# Patient Record
Sex: FEMALE | Race: BLACK OR AFRICAN AMERICAN | NOT HISPANIC OR LATINO | Employment: FULL TIME | URBAN - METROPOLITAN AREA
[De-identification: names, ages, dates, MRNs, and addresses within clinical notes are randomized per-mention and may not be internally consistent; named-entity substitution may affect disease eponyms.]

---

## 2023-04-01 PROBLEM — N92.6 MISSED MENSES: Status: ACTIVE | Noted: 2023-04-01

## 2023-04-01 PROBLEM — N90.89 VULVAR LESION: Status: ACTIVE | Noted: 2023-04-01

## 2023-04-01 PROBLEM — S39.92XA TAILBONE INJURY: Status: ACTIVE | Noted: 2023-04-01

## 2023-04-01 PROBLEM — Z87.828 HISTORY OF INJURY OF NECK: Status: ACTIVE | Noted: 2023-04-01

## 2023-04-01 PROBLEM — N76.0 BV (BACTERIAL VAGINOSIS): Status: ACTIVE | Noted: 2023-04-01

## 2023-04-01 PROBLEM — Z04.9 CONDITION NOT FOUND: Status: ACTIVE | Noted: 2023-04-01

## 2023-04-01 PROBLEM — R12 HEARTBURN DURING PREGNANCY IN THIRD TRIMESTER (HHS-HCC): Status: ACTIVE | Noted: 2023-04-01
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
## 2023-04-01 PROBLEM — B96.89 BV (BACTERIAL VAGINOSIS): Status: ACTIVE | Noted: 2023-04-01

## 2023-04-01 PROBLEM — O26.893 HEARTBURN DURING PREGNANCY IN THIRD TRIMESTER (HHS-HCC): Status: ACTIVE | Noted: 2023-04-01

## 2023-04-01 RX ORDER — MEDROXYPROGESTERONE ACETATE 150 MG/ML
INJECTION, SUSPENSION INTRAMUSCULAR
COMMUNITY

## 2023-04-03 ENCOUNTER — LAB (OUTPATIENT)
Dept: LAB | Facility: LAB | Age: 23
End: 2023-04-03
Payer: COMMERCIAL

## 2023-04-03 ENCOUNTER — OFFICE VISIT (OUTPATIENT)
Dept: PRIMARY CARE | Facility: CLINIC | Age: 23
End: 2023-04-03
Payer: COMMERCIAL

## 2023-04-03 VITALS
BODY MASS INDEX: 21.89 KG/M2 | SYSTOLIC BLOOD PRESSURE: 100 MMHG | WEIGHT: 136.2 LBS | TEMPERATURE: 98.4 F | HEART RATE: 111 BPM | HEIGHT: 66 IN | DIASTOLIC BLOOD PRESSURE: 68 MMHG

## 2023-04-03 DIAGNOSIS — R45.89 DEPRESSED MOOD: ICD-10-CM

## 2023-04-03 DIAGNOSIS — L81.9 DISORDER OF PIGMENTATION OF LIP: Primary | ICD-10-CM

## 2023-04-03 DIAGNOSIS — Z11.3 SCREEN FOR STD (SEXUALLY TRANSMITTED DISEASE): ICD-10-CM

## 2023-04-03 DIAGNOSIS — Z80.0 FAMILY HISTORY OF MALIGNANT NEOPLASM OF COLON IN RELATIVE DIAGNOSED WHEN YOUNGER THAN 50 YEARS OF AGE: ICD-10-CM

## 2023-04-03 PROBLEM — N90.89 VULVAR LESION: Status: RESOLVED | Noted: 2023-04-01 | Resolved: 2023-04-03

## 2023-04-03 PROBLEM — N92.6 MISSED MENSES: Status: RESOLVED | Noted: 2023-04-01 | Resolved: 2023-04-03

## 2023-04-03 PROBLEM — Z87.828 HISTORY OF INJURY OF NECK: Status: RESOLVED | Noted: 2023-04-01 | Resolved: 2023-04-03

## 2023-04-03 PROBLEM — E75.4: Status: ACTIVE | Noted: 2023-04-03

## 2023-04-03 PROBLEM — B96.89 BV (BACTERIAL VAGINOSIS): Status: RESOLVED | Noted: 2023-04-01 | Resolved: 2023-04-03

## 2023-04-03 PROBLEM — S39.92XA TAILBONE INJURY: Status: RESOLVED | Noted: 2023-04-01 | Resolved: 2023-04-03

## 2023-04-03 PROBLEM — N76.0 BV (BACTERIAL VAGINOSIS): Status: RESOLVED | Noted: 2023-04-01 | Resolved: 2023-04-03

## 2023-04-03 PROBLEM — Z04.9 CONDITION NOT FOUND: Status: RESOLVED | Noted: 2023-04-01 | Resolved: 2023-04-03

## 2023-04-03 PROCEDURE — 87661 TRICHOMONAS VAGINALIS AMPLIF: CPT

## 2023-04-03 PROCEDURE — 86592 SYPHILIS TEST NON-TREP QUAL: CPT

## 2023-04-03 PROCEDURE — 87389 HIV-1 AG W/HIV-1&-2 AB AG IA: CPT

## 2023-04-03 PROCEDURE — 87591 N.GONORRHOEAE DNA AMP PROB: CPT

## 2023-04-03 PROCEDURE — 86695 HERPES SIMPLEX TYPE 1 TEST: CPT

## 2023-04-03 PROCEDURE — 87491 CHLMYD TRACH DNA AMP PROBE: CPT

## 2023-04-03 PROCEDURE — 36415 COLL VENOUS BLD VENIPUNCTURE: CPT

## 2023-04-03 PROCEDURE — 84443 ASSAY THYROID STIM HORMONE: CPT

## 2023-04-03 PROCEDURE — 1036F TOBACCO NON-USER: CPT | Performed by: FAMILY MEDICINE

## 2023-04-03 PROCEDURE — 82607 VITAMIN B-12: CPT

## 2023-04-03 PROCEDURE — 80053 COMPREHEN METABOLIC PANEL: CPT

## 2023-04-03 PROCEDURE — 99204 OFFICE O/P NEW MOD 45 MIN: CPT | Performed by: FAMILY MEDICINE

## 2023-04-03 PROCEDURE — 86696 HERPES SIMPLEX TYPE 2 TEST: CPT

## 2023-04-03 PROCEDURE — 82306 VITAMIN D 25 HYDROXY: CPT

## 2023-04-03 PROCEDURE — 85027 COMPLETE CBC AUTOMATED: CPT

## 2023-04-03 PROCEDURE — 87340 HEPATITIS B SURFACE AG IA: CPT

## 2023-04-03 ASSESSMENT — ENCOUNTER SYMPTOMS
LOSS OF SENSATION IN FEET: 0
DEPRESSION: 0
OCCASIONAL FEELINGS OF UNSTEADINESS: 0

## 2023-04-03 NOTE — PROGRESS NOTES
Subjective   Patient ID: Deena Thompson is a 22 y.o. female who presents for Annual Exam.  HPI  The patient is a 23 yo AA Female WITH A STRONG FAMILY HISTORY OF COLON CANCER ( sister diagnosed at 35 yo) who is here for a CPE and the evaluation of her lips hyper pigmentation that started couple weeks ago without any obvious reason. No know associated local trauma or pain.    She is also requesting a blood test to screen for vitamin deficiency.  It was requested by her psychologist, currently treating her depression.    A review of system was completed.  All systems were reviewed and were normal, except for the ones that are listed in the HPI.    Objective   Physical Exam  Constitutional:       Appearance: Normal appearance.   HENT:      Head: Normocephalic and atraumatic.      Right Ear: Tympanic membrane, ear canal and external ear normal.      Left Ear: Tympanic membrane, ear canal and external ear normal.      Nose: Nose normal.      Mouth/Throat:      Mouth: Mucous membranes are moist.      Pharynx: Oropharynx is clear.   Eyes:      Extraocular Movements: Extraocular movements intact.      Conjunctiva/sclera: Conjunctivae normal.      Pupils: Pupils are equal, round, and reactive to light.   Cardiovascular:      Rate and Rhythm: Normal rate and regular rhythm.      Pulses: Normal pulses.   Pulmonary:      Effort: Pulmonary effort is normal.      Breath sounds: Normal breath sounds.   Abdominal:      General: Abdomen is flat. Bowel sounds are normal.      Palpations: Abdomen is soft.   Musculoskeletal:         General: Normal range of motion.      Cervical back: Normal range of motion and neck supple.   Skin:     General: Skin is warm.   Neurological:      General: No focal deficit present.      Mental Status: She is alert and oriented to person, place, and time. Mental status is at baseline.   Psychiatric:         Mood and Affect: Mood normal.         Behavior: Behavior normal.         Thought Content: Thought  content normal.         Judgment: Judgment normal.         Assessment/Plan   Problem List Items Addressed This Visit          Other    Depressed mood    Relevant Orders    CBC    Comprehensive Metabolic Panel    Vitamin D 25-Hydroxy,Total    Vitamin B12    TSH with reflex to Free T4 if abnormal    Disorder of pigmentation of lip - Primary     Observe for now.  Dermatologist referral to consider if persisting.         Family history of malignant neoplasm of colon in relative diagnosed when younger than 50 years of age   1-  CPE   -Diet and life modifications discussed.  -Colon cancer screening recommended to be started at 25 yo because patient's sister was diagnosed wit colon cancer at 62yo.  - last pap smear 2021: [facility]- no pap done last year for pregnancy- recommended to get her pap smear done this year by her GYN.  Discussed today- patient agreed with the above plan of care.

## 2023-04-04 LAB
CHLAMYDIA TRACH., AMPLIFIED: NEGATIVE
N. GONORRHEA, AMPLIFIED: NEGATIVE
TRICHOMONAS VAGINALIS: NEGATIVE

## 2023-04-05 LAB
ALANINE AMINOTRANSFERASE (SGPT) (U/L) IN SER/PLAS: 12 U/L (ref 7–45)
ALBUMIN (G/DL) IN SER/PLAS: 4.3 G/DL (ref 3.4–5)
ALKALINE PHOSPHATASE (U/L) IN SER/PLAS: 64 U/L (ref 33–110)
ANION GAP IN SER/PLAS: 14 MMOL/L (ref 10–20)
ASPARTATE AMINOTRANSFERASE (SGOT) (U/L) IN SER/PLAS: 14 U/L (ref 9–39)
BILIRUBIN TOTAL (MG/DL) IN SER/PLAS: 1 MG/DL (ref 0–1.2)
CALCIDIOL (25 OH VITAMIN D3) (NG/ML) IN SER/PLAS: 21 NG/ML
CALCIUM (MG/DL) IN SER/PLAS: 9.7 MG/DL (ref 8.6–10.6)
CARBON DIOXIDE, TOTAL (MMOL/L) IN SER/PLAS: 24 MMOL/L (ref 21–32)
CHLORIDE (MMOL/L) IN SER/PLAS: 105 MMOL/L (ref 98–107)
COBALAMIN (VITAMIN B12) (PG/ML) IN SER/PLAS: 616 PG/ML (ref 211–911)
CREATININE (MG/DL) IN SER/PLAS: 0.85 MG/DL (ref 0.5–1.05)
ERYTHROCYTE DISTRIBUTION WIDTH (RATIO) BY AUTOMATED COUNT: 12.2 % (ref 11.5–14.5)
ERYTHROCYTE MEAN CORPUSCULAR HEMOGLOBIN CONCENTRATION (G/DL) BY AUTOMATED: 31.9 G/DL (ref 32–36)
ERYTHROCYTE MEAN CORPUSCULAR VOLUME (FL) BY AUTOMATED COUNT: 92 FL (ref 80–100)
ERYTHROCYTES (10*6/UL) IN BLOOD BY AUTOMATED COUNT: 4.13 X10E12/L (ref 4–5.2)
GFR FEMALE: >90 ML/MIN/1.73M2
GLUCOSE (MG/DL) IN SER/PLAS: 80 MG/DL (ref 74–99)
HEMATOCRIT (%) IN BLOOD BY AUTOMATED COUNT: 37.9 % (ref 36–46)
HEMOGLOBIN (G/DL) IN BLOOD: 12.1 G/DL (ref 12–16)
HEPATITIS B VIRUS SURFACE AG PRESENCE IN SERUM: NONREACTIVE
HERPES SIMPLEX VIRUS 1 IGG: >8 INDEX
HERPES SIMPLEX VIRUS 2 IGG: 5.6 INDEX
HIV 1/ 2 AG/AB SCREEN: NONREACTIVE
LEUKOCYTES (10*3/UL) IN BLOOD BY AUTOMATED COUNT: 6.1 X10E9/L (ref 4.4–11.3)
NRBC (PER 100 WBCS) BY AUTOMATED COUNT: 0 /100 WBC (ref 0–0)
PLATELETS (10*3/UL) IN BLOOD AUTOMATED COUNT: 331 X10E9/L (ref 150–450)
POTASSIUM (MMOL/L) IN SER/PLAS: 3.8 MMOL/L (ref 3.5–5.3)
PROTEIN TOTAL: 7 G/DL (ref 6.4–8.2)
RPR MONITORING: NORMAL
SODIUM (MMOL/L) IN SER/PLAS: 139 MMOL/L (ref 136–145)
THYROTROPIN (MIU/L) IN SER/PLAS BY DETECTION LIMIT <= 0.05 MIU/L: 1.13 MIU/L (ref 0.44–3.98)
UREA NITROGEN (MG/DL) IN SER/PLAS: 13 MG/DL (ref 6–23)
VDRL: NONREACTIVE

## 2023-04-11 ENCOUNTER — TELEMEDICINE (OUTPATIENT)
Dept: PRIMARY CARE | Facility: CLINIC | Age: 23
End: 2023-04-11
Payer: COMMERCIAL

## 2023-04-11 DIAGNOSIS — B00.9 HSV-2 INFECTION: Primary | ICD-10-CM

## 2023-04-11 PROCEDURE — 99213 OFFICE O/P EST LOW 20 MIN: CPT | Performed by: FAMILY MEDICINE

## 2023-04-11 NOTE — PROGRESS NOTES
Subjective   Patient ID: Deena Thompson is a 22 y.o. female who presents for No chief complaint on file..  HPI  The patient is here to discuss her blood test results.  She was never diagnosed with HSV infection in the past.  A review of system was completed.  All systems were reviewed and were normal, except for the ones that are listed in the HPI.    Objective   Physical Exam  Constitutional:       Appearance: Normal appearance.   Neurological:      Mental Status: She is alert.         Assessment/Plan   Problem List Items Addressed This Visit          Infectious/Inflammatory    HSV-2 infection - Primary     - HSV II infection discussed today with the patient.  All questions were answered.  Safe sex counseling done.  Valacyclovir recommended.  Patient will call back after having her partner tested.

## 2023-04-11 NOTE — ASSESSMENT & PLAN NOTE
- HSV II infection discussed today with the patient.  All questions were answered.  Safe sex counseling done.  Valacyclovir recommended.  Patient will call back after having her partner tested.

## 2023-07-12 ENCOUNTER — APPOINTMENT (OUTPATIENT)
Dept: LAB | Facility: LAB | Age: 23
End: 2023-07-12
Payer: COMMERCIAL

## 2023-08-21 ENCOUNTER — OFFICE VISIT (OUTPATIENT)
Dept: PRIMARY CARE | Facility: CLINIC | Age: 23
End: 2023-08-21
Payer: COMMERCIAL

## 2023-08-21 VITALS
HEART RATE: 81 BPM | DIASTOLIC BLOOD PRESSURE: 78 MMHG | WEIGHT: 142 LBS | SYSTOLIC BLOOD PRESSURE: 128 MMHG | TEMPERATURE: 98.6 F | HEIGHT: 65 IN | BODY MASS INDEX: 23.66 KG/M2

## 2023-08-21 DIAGNOSIS — L30.9 ACUTE DERMATITIS: Primary | ICD-10-CM

## 2023-08-21 DIAGNOSIS — R00.0 TACHYCARDIA: ICD-10-CM

## 2023-08-21 PROCEDURE — 93000 ELECTROCARDIOGRAM COMPLETE: CPT | Performed by: FAMILY MEDICINE

## 2023-08-21 PROCEDURE — 1036F TOBACCO NON-USER: CPT | Performed by: FAMILY MEDICINE

## 2023-08-21 PROCEDURE — 99214 OFFICE O/P EST MOD 30 MIN: CPT | Performed by: FAMILY MEDICINE

## 2023-08-21 NOTE — ASSESSMENT & PLAN NOTE
-EKG done today.   -Blood test ordered today.  -Holter monitor, then follow-up to discuss results.

## 2023-08-21 NOTE — PROGRESS NOTES
Subjective   Patient ID: Geovani Thompson is a 22 y.o. female who presents for Rash (To inner thighs, itchy x 1-2 months).  Rash    The patient is ha 21 yo Female with no history, here for the management of a new skin pruritic rash that started about a month ago and is not getting better.  Only located on both anterior thighs.  It only appears when she scratches her thighs and with local irritation and heat.   The patient is here for the evaluation of episodic episodes of tachycardia that were diagnosed when she donates blood.  They started about 4- 5 y ago and worsen 4- 5 months ago. No associated dizziness but she has random episodes of dizziness and lightheadedness.  Last time was 3 weeks ago.       A review of system was completed.  All systems were reviewed and were normal, except for the ones that are listed in the HPI.    Objective   Physical Exam  Constitutional:       Appearance: Normal appearance.   HENT:      Head: Normocephalic and atraumatic.      Right Ear: Tympanic membrane, ear canal and external ear normal.      Left Ear: Tympanic membrane, ear canal and external ear normal.      Nose: Nose normal.      Mouth/Throat:      Mouth: Mucous membranes are moist.      Pharynx: Oropharynx is clear.   Eyes:      Extraocular Movements: Extraocular movements intact.      Conjunctiva/sclera: Conjunctivae normal.      Pupils: Pupils are equal, round, and reactive to light.   Cardiovascular:      Rate and Rhythm: Normal rate and regular rhythm.      Pulses: Normal pulses.   Pulmonary:      Effort: Pulmonary effort is normal.      Breath sounds: Normal breath sounds.   Abdominal:      General: Abdomen is flat. Bowel sounds are normal.      Palpations: Abdomen is soft.   Musculoskeletal:         General: Normal range of motion.      Cervical back: Normal range of motion and neck supple.   Skin:     General: Skin is warm.   Neurological:      General: No focal deficit present.      Mental Status: She is alert and  oriented to person, place, and time. Mental status is at baseline.   Psychiatric:         Mood and Affect: Mood normal.         Behavior: Behavior normal.         Thought Content: Thought content normal.         Judgment: Judgment normal.         Assessment/Plan   Problem List Items Addressed This Visit       Acute dermatitis - Primary     -Etiology unclear.  Observe for now.  -Loratadine PRN for now.         Relevant Orders    Hemoglobin A1C    Tachycardia     -EKG done today.   -Blood test ordered today.  -Holter monitor, then follow-up to discuss results.          Relevant Orders    ECG 12 lead (Clinic Performed)    Holter or Event Cardiac Monitor    CBC    Comprehensive Metabolic Panel    TSH with reflex to Free T4 if abnormal    Magnesium    Phosphorus    Hemoglobin A1C            Patient to return to office PRN.

## 2023-09-21 PROBLEM — N90.89 VULVAR LESION: Status: ACTIVE | Noted: 2023-09-21

## 2023-09-21 PROBLEM — Z78.9 KNOWN MEDICAL PROBLEMS: Status: ACTIVE | Noted: 2023-09-21

## 2023-09-21 PROBLEM — Z34.90 PREGNANCY (HHS-HCC): Status: ACTIVE | Noted: 2023-09-21

## 2023-09-21 PROBLEM — Z87.898 HISTORY OF MEDICAL PROBLEMS: Status: ACTIVE | Noted: 2023-09-21

## 2023-09-21 PROBLEM — Z32.01 POSITIVE URINE PREGNANCY TEST (HHS-HCC): Status: ACTIVE | Noted: 2023-09-21

## 2023-09-21 RX ORDER — SERTRALINE HYDROCHLORIDE 25 MG/1
1 TABLET, FILM COATED ORAL DAILY
COMMUNITY
Start: 2023-04-18 | End: 2023-11-09 | Stop reason: WASHOUT

## 2023-09-21 RX ORDER — SERTRALINE HYDROCHLORIDE 100 MG/1
TABLET, FILM COATED ORAL
COMMUNITY
Start: 2023-08-09

## 2023-09-21 RX ORDER — SERTRALINE HYDROCHLORIDE 50 MG/1
1 TABLET, FILM COATED ORAL DAILY
COMMUNITY
Start: 2023-06-17 | End: 2023-11-09 | Stop reason: WASHOUT

## 2023-09-21 RX ORDER — TRIPROLIDINE/PSEUDOEPHEDRINE 2.5MG-60MG
30 TABLET ORAL EVERY 6 HOURS
COMMUNITY
Start: 2014-06-18 | End: 2023-11-09 | Stop reason: ALTCHOICE

## 2023-09-21 RX ORDER — DOCUSATE SODIUM 50 MG/5ML
10 LIQUID ORAL 2 TIMES DAILY PRN
COMMUNITY
Start: 2022-05-17

## 2023-09-21 RX ORDER — CYCLOBENZAPRINE HCL 10 MG
1 TABLET ORAL NIGHTLY
COMMUNITY
Start: 2022-12-19 | End: 2023-11-09 | Stop reason: WASHOUT

## 2023-10-20 ENCOUNTER — OFFICE VISIT (OUTPATIENT)
Dept: OBSTETRICS AND GYNECOLOGY | Facility: CLINIC | Age: 23
End: 2023-10-20
Payer: COMMERCIAL

## 2023-10-20 ENCOUNTER — APPOINTMENT (OUTPATIENT)
Dept: PRIMARY CARE | Facility: CLINIC | Age: 23
End: 2023-10-20
Payer: COMMERCIAL

## 2023-10-20 ENCOUNTER — LAB (OUTPATIENT)
Dept: LAB | Facility: LAB | Age: 23
End: 2023-10-20
Payer: COMMERCIAL

## 2023-10-20 DIAGNOSIS — Z30.42 ENCOUNTER FOR MANAGEMENT AND INJECTION OF DEPO-PROVERA: Primary | ICD-10-CM

## 2023-10-20 DIAGNOSIS — L30.9 ACUTE DERMATITIS: ICD-10-CM

## 2023-10-20 DIAGNOSIS — R00.0 TACHYCARDIA: ICD-10-CM

## 2023-10-20 LAB
ALBUMIN SERPL BCP-MCNC: 4.6 G/DL (ref 3.4–5)
ALP SERPL-CCNC: 56 U/L (ref 33–110)
ALT SERPL W P-5'-P-CCNC: 15 U/L (ref 7–45)
ANION GAP SERPL CALC-SCNC: 13 MMOL/L (ref 10–20)
AST SERPL W P-5'-P-CCNC: 16 U/L (ref 9–39)
BILIRUB SERPL-MCNC: 0.8 MG/DL (ref 0–1.2)
BUN SERPL-MCNC: 9 MG/DL (ref 6–23)
CALCIUM SERPL-MCNC: 9.5 MG/DL (ref 8.6–10.6)
CHLORIDE SERPL-SCNC: 105 MMOL/L (ref 98–107)
CO2 SERPL-SCNC: 25 MMOL/L (ref 21–32)
CREAT SERPL-MCNC: 0.69 MG/DL (ref 0.5–1.05)
ERYTHROCYTE [DISTWIDTH] IN BLOOD BY AUTOMATED COUNT: 12.3 % (ref 11.5–14.5)
GFR SERPL CREATININE-BSD FRML MDRD: >90 ML/MIN/1.73M*2
GLUCOSE SERPL-MCNC: 84 MG/DL (ref 74–99)
HCT VFR BLD AUTO: 39.3 % (ref 36–46)
HGB BLD-MCNC: 12.5 G/DL (ref 12–16)
MAGNESIUM SERPL-MCNC: 2.19 MG/DL (ref 1.6–2.4)
MCH RBC QN AUTO: 29.5 PG (ref 26–34)
MCHC RBC AUTO-ENTMCNC: 31.8 G/DL (ref 32–36)
MCV RBC AUTO: 93 FL (ref 80–100)
NRBC BLD-RTO: 0 /100 WBCS (ref 0–0)
PHOSPHATE SERPL-MCNC: 3.3 MG/DL (ref 2.5–4.9)
PLATELET # BLD AUTO: 327 X10*3/UL (ref 150–450)
PMV BLD AUTO: 10.2 FL (ref 7.5–11.5)
POTASSIUM SERPL-SCNC: 3.8 MMOL/L (ref 3.5–5.3)
PROT SERPL-MCNC: 7.5 G/DL (ref 6.4–8.2)
RBC # BLD AUTO: 4.24 X10*6/UL (ref 4–5.2)
SODIUM SERPL-SCNC: 139 MMOL/L (ref 136–145)
WBC # BLD AUTO: 6 X10*3/UL (ref 4.4–11.3)

## 2023-10-20 PROCEDURE — 1036F TOBACCO NON-USER: CPT | Performed by: NURSE PRACTITIONER

## 2023-10-20 PROCEDURE — 84100 ASSAY OF PHOSPHORUS: CPT

## 2023-10-20 PROCEDURE — 85027 COMPLETE CBC AUTOMATED: CPT

## 2023-10-20 PROCEDURE — 84443 ASSAY THYROID STIM HORMONE: CPT

## 2023-10-20 PROCEDURE — 36415 COLL VENOUS BLD VENIPUNCTURE: CPT

## 2023-10-20 PROCEDURE — 80053 COMPREHEN METABOLIC PANEL: CPT

## 2023-10-20 PROCEDURE — 83036 HEMOGLOBIN GLYCOSYLATED A1C: CPT

## 2023-10-20 PROCEDURE — 96372 THER/PROPH/DIAG INJ SC/IM: CPT | Performed by: NURSE PRACTITIONER

## 2023-10-20 PROCEDURE — 83735 ASSAY OF MAGNESIUM: CPT

## 2023-10-20 RX ORDER — MEDROXYPROGESTERONE ACETATE 150 MG/ML
150 INJECTION, SUSPENSION INTRAMUSCULAR ONCE
Status: COMPLETED | OUTPATIENT
Start: 2023-10-20 | End: 2023-10-20

## 2023-10-20 RX ADMIN — MEDROXYPROGESTERONE ACETATE 150 MG: 150 INJECTION, SUSPENSION INTRAMUSCULAR at 16:15

## 2023-10-20 NOTE — PROGRESS NOTES
Last Depo- 7/28/2023  Next Depo due- 1/5/1/2024  Last JS-7/28/2023  UPT offered-declined  LMP-none  Complaints with being on Depo Provera-none  Pt given Depo without difficulties into-rt. upper arm  Pt tolerated injection well     Supplied Depo Provera from office stock  Lot#:[id number]  EXP 12/31/2024

## 2023-10-21 LAB
EST. AVERAGE GLUCOSE BLD GHB EST-MCNC: 100 MG/DL
HBA1C MFR BLD: 5.1 %
TSH SERPL-ACNC: 2.61 MIU/L (ref 0.44–3.98)

## 2023-11-09 ENCOUNTER — OFFICE VISIT (OUTPATIENT)
Dept: PRIMARY CARE | Facility: CLINIC | Age: 23
End: 2023-11-09
Payer: COMMERCIAL

## 2023-11-09 ENCOUNTER — APPOINTMENT (OUTPATIENT)
Dept: PRIMARY CARE | Facility: CLINIC | Age: 23
End: 2023-11-09
Payer: COMMERCIAL

## 2023-11-09 VITALS
HEIGHT: 65 IN | BODY MASS INDEX: 24.99 KG/M2 | SYSTOLIC BLOOD PRESSURE: 120 MMHG | DIASTOLIC BLOOD PRESSURE: 82 MMHG | WEIGHT: 150 LBS

## 2023-11-09 DIAGNOSIS — K42.9 UMBILICAL HERNIA WITHOUT OBSTRUCTION AND WITHOUT GANGRENE: Primary | ICD-10-CM

## 2023-11-09 PROCEDURE — 99213 OFFICE O/P EST LOW 20 MIN: CPT

## 2023-11-09 PROCEDURE — 1036F TOBACCO NON-USER: CPT

## 2023-11-09 ASSESSMENT — PATIENT HEALTH QUESTIONNAIRE - PHQ9
SUM OF ALL RESPONSES TO PHQ9 QUESTIONS 1 AND 2: 1
1. LITTLE INTEREST OR PLEASURE IN DOING THINGS: NOT AT ALL
10. IF YOU CHECKED OFF ANY PROBLEMS, HOW DIFFICULT HAVE THESE PROBLEMS MADE IT FOR YOU TO DO YOUR WORK, TAKE CARE OF THINGS AT HOME, OR GET ALONG WITH OTHER PEOPLE: NOT DIFFICULT AT ALL
2. FEELING DOWN, DEPRESSED OR HOPELESS: SEVERAL DAYS

## 2023-11-09 NOTE — PROGRESS NOTES
"Subjective   Patient ID: Deena Thompson \"Luna" is a 23 y.o. female who presents for Post-op Hernia (Umbilical hernia never had surgery).  HPI  23 year old female with depression presents today for umbilical hernia.   Presents today due to umbilical hernia that she developed during her first pregnancy, approximately 4 years ago. Also has 1 year old daughter.   Did have positive pregnancy test 2 weeks ago. LMP was 9/5/2023, has not yet made appointment with OBGYN.   Is having nausea that is well controlled and manageable.     All systems have been reviewed and are negative for complaint other than those mentioned in the HPI.     /82 (BP Location: Left arm, Patient Position: Sitting, BP Cuff Size: Adult)   Ht 1.651 m (5' 5\")   Wt 68 kg (150 lb)   LMP 04/03/2023 (Exact Date)   BMI 24.96 kg/m²    Objective   Physical Exam  Constitutional:       General: She is awake.      Appearance: Normal appearance.   HENT:      Head: Normocephalic and atraumatic.   Eyes:      Extraocular Movements: Extraocular movements intact.      Pupils: Pupils are equal, round, and reactive to light.   Cardiovascular:      Rate and Rhythm: Normal rate and regular rhythm.      Heart sounds: S1 normal and S2 normal. No murmur heard.  Pulmonary:      Effort: Pulmonary effort is normal.      Breath sounds: Normal breath sounds.   Musculoskeletal:      Cervical back: Normal range of motion and neck supple.      Right lower leg: No edema.      Left lower leg: No edema.   Skin:     General: Skin is warm and dry.   Neurological:      General: No focal deficit present.      Mental Status: She is alert and oriented to person, place, and time.   Psychiatric:         Mood and Affect: Mood and affect normal.         Behavior: Behavior normal. Behavior is cooperative.         Thought Content: Thought content normal.         Judgment: Judgment normal.     Deena was seen today for post-op hernia.  Diagnoses and all orders for this " visit:  Umbilical hernia without obstruction and without gangrene (Primary)   - Patient reports she has had an umbilical hernia x4 years   - Approx 8 weeks pregnant based on LMP   - Reports hernia is slightly increasing in size. It is non tender to touch, soft, easily reducible.   - Will defer to OBGYN but likely defer surgical repair during pregnancy   - Recommend scheduling appointment with OBGYN for for prenatal care.

## 2024-01-05 ENCOUNTER — APPOINTMENT (OUTPATIENT)
Dept: OBSTETRICS AND GYNECOLOGY | Facility: CLINIC | Age: 24
End: 2024-01-05
Payer: COMMERCIAL

## 2024-01-15 ENCOUNTER — APPOINTMENT (OUTPATIENT)
Dept: ORTHOPEDIC SURGERY | Facility: HOSPITAL | Age: 24
End: 2024-01-15
Payer: COMMERCIAL

## 2024-03-08 ENCOUNTER — APPOINTMENT (OUTPATIENT)
Dept: SURGERY | Facility: CLINIC | Age: 24
End: 2024-03-08
Payer: COMMERCIAL

## 2024-03-13 ENCOUNTER — APPOINTMENT (OUTPATIENT)
Dept: SURGERY | Facility: CLINIC | Age: 24
End: 2024-03-13
Payer: COMMERCIAL

## 2024-03-14 ENCOUNTER — APPOINTMENT (OUTPATIENT)
Dept: SURGERY | Facility: CLINIC | Age: 24
End: 2024-03-14
Payer: COMMERCIAL

## 2024-03-15 ENCOUNTER — APPOINTMENT (OUTPATIENT)
Dept: SURGERY | Facility: CLINIC | Age: 24
End: 2024-03-15
Payer: COMMERCIAL

## 2024-03-19 ENCOUNTER — APPOINTMENT (OUTPATIENT)
Dept: OBSTETRICS AND GYNECOLOGY | Facility: CLINIC | Age: 24
End: 2024-03-19
Payer: COMMERCIAL

## 2024-04-16 ENCOUNTER — APPOINTMENT (OUTPATIENT)
Dept: SURGERY | Facility: CLINIC | Age: 24
End: 2024-04-16
Payer: COMMERCIAL

## 2024-05-15 ENCOUNTER — APPOINTMENT (OUTPATIENT)
Dept: PRIMARY CARE | Facility: CLINIC | Age: 24
End: 2024-05-15
Payer: COMMERCIAL

## 2024-06-14 ENCOUNTER — APPOINTMENT (OUTPATIENT)
Dept: DERMATOLOGY | Facility: CLINIC | Age: 24
End: 2024-06-14
Payer: COMMERCIAL

## 2024-06-26 ENCOUNTER — APPOINTMENT (OUTPATIENT)
Dept: PRIMARY CARE | Facility: CLINIC | Age: 24
End: 2024-06-26
Payer: COMMERCIAL

## 2024-07-11 ENCOUNTER — APPOINTMENT (OUTPATIENT)
Dept: GASTROENTEROLOGY | Facility: CLINIC | Age: 24
End: 2024-07-11
Payer: COMMERCIAL

## 2024-07-12 ENCOUNTER — APPOINTMENT (OUTPATIENT)
Dept: PRIMARY CARE | Facility: CLINIC | Age: 24
End: 2024-07-12
Payer: COMMERCIAL

## 2024-07-16 RX ORDER — IBUPROFEN 200 MG
600 TABLET ORAL 4 TIMES DAILY
COMMUNITY
Start: 2024-06-13

## 2024-07-17 ENCOUNTER — LAB (OUTPATIENT)
Dept: LAB | Facility: LAB | Age: 24
End: 2024-07-17
Payer: COMMERCIAL

## 2024-07-17 ENCOUNTER — APPOINTMENT (OUTPATIENT)
Dept: PRIMARY CARE | Facility: CLINIC | Age: 24
End: 2024-07-17
Payer: COMMERCIAL

## 2024-07-17 VITALS
SYSTOLIC BLOOD PRESSURE: 109 MMHG | WEIGHT: 141 LBS | HEIGHT: 64 IN | DIASTOLIC BLOOD PRESSURE: 72 MMHG | BODY MASS INDEX: 24.07 KG/M2

## 2024-07-17 DIAGNOSIS — Z80.0 FAMILY HISTORY OF MALIGNANT NEOPLASM OF COLON IN FIRST DEGREE RELATIVE DIAGNOSED WHEN YOUNGER THAN 60 YEARS OF AGE: ICD-10-CM

## 2024-07-17 DIAGNOSIS — Z11.3 SCREEN FOR STD (SEXUALLY TRANSMITTED DISEASE): ICD-10-CM

## 2024-07-17 DIAGNOSIS — Z00.00 HEALTH MAINTENANCE EXAMINATION: ICD-10-CM

## 2024-07-17 DIAGNOSIS — Z00.00 HEALTH MAINTENANCE EXAMINATION: Primary | ICD-10-CM

## 2024-07-17 PROBLEM — O26.893 HEARTBURN DURING PREGNANCY IN THIRD TRIMESTER (HHS-HCC): Status: RESOLVED | Noted: 2023-04-01 | Resolved: 2024-07-17

## 2024-07-17 PROBLEM — Z34.90 PREGNANCY (HHS-HCC): Status: RESOLVED | Noted: 2023-09-21 | Resolved: 2024-07-17
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
## 2024-07-17 PROBLEM — R00.0 TACHYCARDIA: Status: RESOLVED | Noted: 2023-08-21 | Resolved: 2024-07-17

## 2024-07-17 PROBLEM — N90.89 VULVAR LESION: Status: RESOLVED | Noted: 2023-09-21 | Resolved: 2024-07-17

## 2024-07-17 PROBLEM — Z32.01 POSITIVE URINE PREGNANCY TEST (HHS-HCC): Status: RESOLVED | Noted: 2023-09-21 | Resolved: 2024-07-17

## 2024-07-17 PROBLEM — R12 HEARTBURN DURING PREGNANCY IN THIRD TRIMESTER (HHS-HCC): Status: RESOLVED | Noted: 2023-04-01 | Resolved: 2024-07-17

## 2024-07-17 PROBLEM — L81.9: Status: RESOLVED | Noted: 2023-04-03 | Resolved: 2024-07-17

## 2024-07-17 LAB
EST. AVERAGE GLUCOSE BLD GHB EST-MCNC: 103 MG/DL
HBA1C MFR BLD: 5.2 %

## 2024-07-17 PROCEDURE — 83036 HEMOGLOBIN GLYCOSYLATED A1C: CPT

## 2024-07-17 PROCEDURE — 87389 HIV-1 AG W/HIV-1&-2 AB AG IA: CPT

## 2024-07-17 PROCEDURE — 3008F BODY MASS INDEX DOCD: CPT

## 2024-07-17 PROCEDURE — 86803 HEPATITIS C AB TEST: CPT

## 2024-07-17 PROCEDURE — 84443 ASSAY THYROID STIM HORMONE: CPT

## 2024-07-17 PROCEDURE — 86780 TREPONEMA PALLIDUM: CPT

## 2024-07-17 PROCEDURE — 1036F TOBACCO NON-USER: CPT

## 2024-07-17 PROCEDURE — 99395 PREV VISIT EST AGE 18-39: CPT

## 2024-07-17 PROCEDURE — 87591 N.GONORRHOEAE DNA AMP PROB: CPT

## 2024-07-17 PROCEDURE — 80061 LIPID PANEL: CPT

## 2024-07-17 PROCEDURE — 87491 CHLMYD TRACH DNA AMP PROBE: CPT

## 2024-07-17 PROCEDURE — 87661 TRICHOMONAS VAGINALIS AMPLIF: CPT

## 2024-07-17 PROCEDURE — 80053 COMPREHEN METABOLIC PANEL: CPT

## 2024-07-17 PROCEDURE — 82306 VITAMIN D 25 HYDROXY: CPT

## 2024-07-17 PROCEDURE — 36415 COLL VENOUS BLD VENIPUNCTURE: CPT

## 2024-07-17 ASSESSMENT — PATIENT HEALTH QUESTIONNAIRE - PHQ9
1. LITTLE INTEREST OR PLEASURE IN DOING THINGS: NOT AT ALL
SUM OF ALL RESPONSES TO PHQ9 QUESTIONS 1 AND 2: 0
2. FEELING DOWN, DEPRESSED OR HOPELESS: NOT AT ALL

## 2024-07-17 ASSESSMENT — LIFESTYLE VARIABLES: HOW MANY STANDARD DRINKS CONTAINING ALCOHOL DO YOU HAVE ON A TYPICAL DAY: PATIENT DOES NOT DRINK

## 2024-07-17 NOTE — PROGRESS NOTES
"Subjective   Patient ID: Deena Thompson \"Luna" is a 23 y.o. female who presents for Annual Exam.  HPI  23 year old female with no significant Pmh presents today for CPE.   PSH: umbilical hernia repair   PFH: Updated     Has 5 year old and 2 year old girls.   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     Takes sertraline for depression and anxiety, sees psychiatry.     All systems have been reviewed and are negative for complaint other than those mentioned in the HPI.     Objective   /72 (BP Location: Left arm, Patient Position: Sitting, BP Cuff Size: Adult)   Ht 1.626 m (5' 4\")   Wt 64 kg (141 lb)   Breastfeeding Unknown   BMI 24.20 kg/m²    Physical Exam  Constitutional:       General: She is awake.      Appearance: Normal appearance.   HENT:      Head: Normocephalic and atraumatic.   Eyes:      Extraocular Movements: Extraocular movements intact.      Conjunctiva/sclera: Conjunctivae normal.      Pupils: Pupils are equal, round, and reactive to light.   Neck:      Thyroid: No thyroid mass or thyromegaly.      Vascular: No carotid bruit.   Cardiovascular:      Rate and Rhythm: Normal rate and regular rhythm.      Pulses: Normal pulses.      Heart sounds: S1 normal and S2 normal. No murmur heard.  Pulmonary:      Effort: Pulmonary effort is normal.      Breath sounds: Normal breath sounds.   Abdominal:      General: Abdomen is flat. Bowel sounds are normal.      Palpations: Abdomen is soft. There is no hepatomegaly, splenomegaly, mass or pulsatile mass.      Tenderness: There is no abdominal tenderness.   Musculoskeletal:      Cervical back: Normal range of motion and neck supple.      Right lower leg: No edema.      Left lower leg: No edema.   Skin:     General: Skin is warm and dry.      Capillary Refill: Capillary refill takes less than 2 seconds.   Neurological:      General: No focal deficit present.      Mental Status: She is alert and oriented to person, place, and time.   Psychiatric:         Mood and " "Affect: Mood normal.         Behavior: Behavior normal. Behavior is cooperative.         Thought Content: Thought content normal.         Judgment: Judgment normal.     Geovani'Ely \"Geovani\" was seen today for annual exam.  Diagnoses and all orders for this visit:  Health maintenance examination (Primary)  -    UTD immunizations  - Due for health maintenance labs, ordered  - UTD pap  - Start mammogram at 38  - FDR dx with colon cancer at 34, no genetic workup, recommend starting colonoscopy 10 years before FDR dx. Ordered for patient to complete at age 24.   -  Comprehensive Metabolic Panel; Future  -     Lipid Panel; Future  -     TSH with reflex to Free T4 if abnormal; Future  -     Hemoglobin A1C; Future  -     Vitamin D 25-Hydroxy,Total (for eval of Vitamin D levels); Future  Family history of malignant neoplasm of colon in first degree relative diagnosed when younger than 60 years of age  -     FDR dx at age 34, recommend starting screening at 24.   - Colonoscopy Screening; Average Risk Patient; Future  Screen for STD (sexually transmitted disease)  -     C. Trachomatis / N. Gonorrhoeae, Amplified Detection; Future  -     HIV 1/2 Antigen/Antibody Screen with Reflex to Confirmation; Future  -     Syphilis Screen with Reflex; Future  -     Trichomonas vaginalis, Nucleic Acid Detection; Future  -     Hepatitis C antibody; Future    Follow up in 1 year for annual physical or PRN  "

## 2024-07-18 DIAGNOSIS — E55.9 VITAMIN D DEFICIENCY: Primary | ICD-10-CM

## 2024-07-18 LAB
25(OH)D3 SERPL-MCNC: 24 NG/ML (ref 30–100)
ALBUMIN SERPL BCP-MCNC: 4.6 G/DL (ref 3.4–5)
ALP SERPL-CCNC: 59 U/L (ref 33–110)
ALT SERPL W P-5'-P-CCNC: 28 U/L (ref 7–45)
ANION GAP SERPL CALC-SCNC: 13 MMOL/L (ref 10–20)
AST SERPL W P-5'-P-CCNC: 21 U/L (ref 9–39)
BILIRUB SERPL-MCNC: 0.9 MG/DL (ref 0–1.2)
BUN SERPL-MCNC: 11 MG/DL (ref 6–23)
C TRACH RRNA SPEC QL NAA+PROBE: NEGATIVE
CALCIUM SERPL-MCNC: 9.9 MG/DL (ref 8.6–10.6)
CHLORIDE SERPL-SCNC: 105 MMOL/L (ref 98–107)
CHOLEST SERPL-MCNC: 168 MG/DL (ref 0–199)
CHOLESTEROL/HDL RATIO: 3.9
CO2 SERPL-SCNC: 26 MMOL/L (ref 21–32)
CREAT SERPL-MCNC: 0.82 MG/DL (ref 0.5–1.05)
EGFRCR SERPLBLD CKD-EPI 2021: >90 ML/MIN/1.73M*2
GLUCOSE SERPL-MCNC: 91 MG/DL (ref 74–99)
HCV AB SER QL: NONREACTIVE
HDLC SERPL-MCNC: 43.6 MG/DL
HIV 1+2 AB+HIV1 P24 AG SERPL QL IA: NONREACTIVE
LDLC SERPL CALC-MCNC: 113 MG/DL
N GONORRHOEA DNA SPEC QL PROBE+SIG AMP: NEGATIVE
NON HDL CHOLESTEROL: 124 MG/DL (ref 0–149)
POTASSIUM SERPL-SCNC: 4.1 MMOL/L (ref 3.5–5.3)
PROT SERPL-MCNC: 7.4 G/DL (ref 6.4–8.2)
SODIUM SERPL-SCNC: 140 MMOL/L (ref 136–145)
T VAGINALIS RRNA SPEC QL NAA+PROBE: NEGATIVE
TREPONEMA PALLIDUM IGG+IGM AB [PRESENCE] IN SERUM OR PLASMA BY IMMUNOASSAY: NONREACTIVE
TRIGL SERPL-MCNC: 59 MG/DL (ref 0–149)
TSH SERPL-ACNC: 1.3 MIU/L (ref 0.44–3.98)
VLDL: 12 MG/DL (ref 0–40)

## 2024-07-18 RX ORDER — CHOLECALCIFEROL (VITAMIN D3) 50 MCG
50 TABLET ORAL DAILY
Qty: 90 TABLET | Refills: 3 | Status: SHIPPED | OUTPATIENT
Start: 2024-07-18 | End: 2025-07-18

## 2024-07-19 DIAGNOSIS — Z12.11 COLON CANCER SCREENING: Primary | ICD-10-CM

## 2024-07-19 RX ORDER — POLYETHYLENE GLYCOL 3350, SODIUM CHLORIDE, SODIUM BICARBONATE AND POTASSIUM CHLORIDE 420; 5.72; 11.2; 1.48 G/4L; G/4L; G/4L; G/4L
4000 POWDER, FOR SOLUTION ORAL ONCE
Qty: 4000 ML | Refills: 0 | Status: SHIPPED | OUTPATIENT
Start: 2024-07-19 | End: 2024-07-19

## 2024-07-22 ENCOUNTER — APPOINTMENT (OUTPATIENT)
Dept: DERMATOLOGY | Facility: CLINIC | Age: 24
End: 2024-07-22

## 2024-08-21 ENCOUNTER — APPOINTMENT (OUTPATIENT)
Dept: PRIMARY CARE | Facility: CLINIC | Age: 24
End: 2024-08-21
Payer: COMMERCIAL

## 2024-08-21 ENCOUNTER — APPOINTMENT (OUTPATIENT)
Dept: DERMATOLOGY | Facility: CLINIC | Age: 24
End: 2024-08-21
Payer: COMMERCIAL

## 2024-08-21 DIAGNOSIS — L21.9 SEBORRHEIC DERMATITIS: Primary | ICD-10-CM

## 2024-08-21 PROCEDURE — 99203 OFFICE O/P NEW LOW 30 MIN: CPT | Performed by: DERMATOLOGY

## 2024-08-21 RX ORDER — KETOCONAZOLE 20 MG/G
CREAM TOPICAL 2 TIMES DAILY
Qty: 60 G | Refills: 11 | Status: SHIPPED | OUTPATIENT
Start: 2024-08-21

## 2024-08-21 RX ORDER — KETOCONAZOLE 20 MG/ML
SHAMPOO, SUSPENSION TOPICAL
Qty: 120 ML | Refills: 11 | Status: SHIPPED | OUTPATIENT
Start: 2024-08-25

## 2024-08-21 ASSESSMENT — PATIENT GLOBAL ASSESSMENT (PGA): WHAT IS THE PGA: PATIENT GLOBAL ASSESSMENT:  3 - MODERATE

## 2024-08-21 ASSESSMENT — DERMATOLOGY QUALITY OF LIFE (QOL) ASSESSMENT
WHAT SINGLE SKIN CONDITION LISTED BELOW IS THE PATIENT ANSWERING THE QUALITY-OF-LIFE ASSESSMENT QUESTIONS ABOUT: PSORIASIS
RATE HOW BOTHERED YOU ARE BY SYMPTOMS OF YOUR SKIN PROBLEM (EG, ITCHING, STINGING BURNING, HURTING OR SKIN IRRITATION): 4
RATE HOW BOTHERED YOU ARE BY EFFECTS OF YOUR SKIN PROBLEMS ON YOUR ACTIVITIES (EG, GOING OUT, ACCOMPLISHING WHAT YOU WANT, WORK ACTIVITIES OR YOUR RELATIONSHIPS WITH OTHERS): 2
RATE HOW BOTHERED YOU ARE BY EFFECTS OF YOUR SKIN PROBLEMS ON YOUR ACTIVITIES (EG, GOING OUT, ACCOMPLISHING WHAT YOU WANT, WORK ACTIVITIES OR YOUR RELATIONSHIPS WITH OTHERS): 2
WHAT SINGLE SKIN CONDITION LISTED BELOW IS THE PATIENT ANSWERING THE QUALITY-OF-LIFE ASSESSMENT QUESTIONS ABOUT: PSORIASIS
RATE HOW EMOTIONALLY BOTHERED YOU ARE BY YOUR SKIN PROBLEM (FOR EXAMPLE, WORRY, EMBARRASSMENT, FRUSTRATION): 3
RATE HOW EMOTIONALLY BOTHERED YOU ARE BY YOUR SKIN PROBLEM (FOR EXAMPLE, WORRY, EMBARRASSMENT, FRUSTRATION): 3
RATE HOW BOTHERED YOU ARE BY SYMPTOMS OF YOUR SKIN PROBLEM (EG, ITCHING, STINGING BURNING, HURTING OR SKIN IRRITATION): 4

## 2024-08-21 NOTE — PROGRESS NOTES
Subjective     Geovani Thompson is a 23 y.o. female who presents for the following: No chief complaint on file..     New patient here for evaluation of rash on her scalp, eyebrows, and nose. States that it is more flaky. Denies any itching. Patient washes her hair every 2 weeks.     Mother has history of psoriasis, reported to have similar lesions as herself.     Review of Systems:  No other skin or systemic complaints other than what is documented elsewhere in the note.    The following portions of the chart were reviewed this encounter and updated as appropriate:       Skin Cancer History  No skin cancer on file.    Specialty Problems          Dermatology Problems    Acute dermatitis     Past Medical History:  Geovani Thompson  has a past medical history of 27 weeks gestation of pregnancy ([facility]) (2018), Anemia complicating pregnancy, second trimester ([facility]) (2018), Encounter for supervision of normal first pregnancy, second trimester ([facility]) (2018), Encounter for supervision of normal first pregnancy, unspecified trimester ([facility]) (10/30/2018), Supervision of pregnancy with insufficient  care, second trimester ([facility]) (2018), and Unspecified pre-existing hypertension complicating pregnancy, unspecified trimester ([facility]) (10/31/2018).    Past Surgical History:  Geovani Thompson  has no past surgical history on file.    Family History:  Patient family history includes Colon cancer at 37yo in her sister; Diabetes type II in her mother and sister; Multiple sclerosis in her father and paternal grandmother.    Social History:  Geovani Thompson  reports that she has never smoked. She has never been exposed to tobacco smoke. She has never used smokeless tobacco. She reports that she does not drink alcohol and does not use drugs.    Allergies:  Patient has no known allergies.    Current Medications / CAM's:    Current Outpatient Medications:     cholecalciferol (Vitamin D-3) 50 MCG (  UT) tablet, Take 1 tablet (50 mcg) by mouth once daily., Disp: 90 tablet, Rfl: 3    ibuprofen 200 mg tablet, Take 3 tablets (600 mg) by mouth 4 times a day., Disp: , Rfl:     ketoconazole (NIZOral) 2 % cream, Apply topically 2 times a day. To eyebrows, nose area that is flaky, Disp: 60 g, Rfl: 11    [START ON 8/25/2024] ketoconazole (NIZOral) 2 % shampoo, Apply topically 1 (one) time per week. Lather to scalp for 10 minutes when showering then rinse. The more often you wash with this shampoo the better it works, Disp: 120 mL, Rfl: 11    sertraline (Zoloft) 100 mg tablet, , Disp: , Rfl:      Objective   Well appearing patient in no apparent distress; mood and affect are within normal limits.    A skin examination was performed including the scalp, face and neck. All findings within normal limits unless otherwise noted below.     Assessment/Plan   1. Seborrheic dermatitis  Head - Anterior (Face), Scalp  Erythema with overlying greasy scale.    Seborrheic dermatitis  - Start ketoconazole 2% shampoo once or twice weekly. Lather to scalp for 10 minutes then rinse.  - Start ketoconazole 2% cream BID to affected areas of the eyebrows, central face  - RTC 7 months    Related Procedures  Follow Up In Dermatology - Established Patient    Related Medications  ketoconazole (NIZOral) 2 % cream  Apply topically 2 times a day. To eyebrows, nose area that is flaky    ketoconazole (NIZOral) 2 % shampoo  Apply topically 1 (one) time per week. Lather to scalp for 10 minutes when showering then rinse. The more often you wash with this shampoo the better it works      uHsam Chapman DO  PGY-4 Dermatology    I saw and evaluated the patient. I personally obtained the key and critical portions of the history and physical exam or was physically present for key and critical portions performed by the resident/fellow. I reviewed the resident/fellow's documentation and discussed the patient with the resident/fellow. I agree with the  resident/fellow's medical decision making as documented in the note.    Nabil Johnson MD PhD

## 2024-09-13 ENCOUNTER — APPOINTMENT (OUTPATIENT)
Dept: GASTROENTEROLOGY | Facility: CLINIC | Age: 24
End: 2024-09-13
Payer: COMMERCIAL

## 2024-09-24 ENCOUNTER — APPOINTMENT (OUTPATIENT)
Dept: GASTROENTEROLOGY | Facility: HOSPITAL | Age: 24
End: 2024-09-24
Payer: COMMERCIAL

## 2024-10-02 ENCOUNTER — APPOINTMENT (OUTPATIENT)
Dept: GASTROENTEROLOGY | Facility: CLINIC | Age: 24
End: 2024-10-02
Payer: COMMERCIAL

## 2024-10-11 ENCOUNTER — DOCUMENTATION (OUTPATIENT)
Dept: OBSTETRICS AND GYNECOLOGY | Facility: CLINIC | Age: 24
End: 2024-10-11
Payer: COMMERCIAL

## 2024-10-17 ENCOUNTER — APPOINTMENT (OUTPATIENT)
Dept: OBSTETRICS AND GYNECOLOGY | Facility: CLINIC | Age: 24
End: 2024-10-17
Payer: COMMERCIAL

## 2024-10-21 DIAGNOSIS — R45.89 DEPRESSED MOOD: Primary | ICD-10-CM

## 2024-11-18 ENCOUNTER — APPOINTMENT (OUTPATIENT)
Dept: OBSTETRICS AND GYNECOLOGY | Facility: CLINIC | Age: 24
End: 2024-11-18
Payer: COMMERCIAL

## 2024-11-18 VITALS
WEIGHT: 146 LBS | HEIGHT: 64 IN | DIASTOLIC BLOOD PRESSURE: 68 MMHG | RESPIRATION RATE: 16 BRPM | SYSTOLIC BLOOD PRESSURE: 103 MMHG | HEART RATE: 89 BPM | BODY MASS INDEX: 24.92 KG/M2

## 2024-11-18 DIAGNOSIS — Z01.419 WELL WOMAN EXAM WITH ROUTINE GYNECOLOGICAL EXAM: ICD-10-CM

## 2024-11-18 DIAGNOSIS — Z87.42 HISTORY OF ABNORMAL CERVICAL PAP SMEAR: Primary | ICD-10-CM

## 2024-11-18 PROCEDURE — 88175 CYTOPATH C/V AUTO FLUID REDO: CPT

## 2024-11-18 PROCEDURE — 87624 HPV HI-RISK TYP POOLED RSLT: CPT

## 2024-11-18 PROCEDURE — 3008F BODY MASS INDEX DOCD: CPT | Performed by: NURSE PRACTITIONER

## 2024-11-18 PROCEDURE — 87591 N.GONORRHOEAE DNA AMP PROB: CPT

## 2024-11-18 PROCEDURE — 87491 CHLMYD TRACH DNA AMP PROBE: CPT

## 2024-11-18 PROCEDURE — 99395 PREV VISIT EST AGE 18-39: CPT | Performed by: NURSE PRACTITIONER

## 2024-11-18 PROCEDURE — 87661 TRICHOMONAS VAGINALIS AMPLIF: CPT

## 2024-11-18 PROCEDURE — 88141 CYTOPATH C/V INTERPRET: CPT | Performed by: PATHOLOGY

## 2024-11-18 PROCEDURE — 1036F TOBACCO NON-USER: CPT | Performed by: NURSE PRACTITIONER

## 2024-11-18 ASSESSMENT — SOCIAL DETERMINANTS OF HEALTH (SDOH)

## 2024-11-18 ASSESSMENT — PAIN SCALES - GENERAL: PAINLEVEL_OUTOF10: 0-NO PAIN

## 2024-11-18 NOTE — PROGRESS NOTES
"Viola Dozier, APRN-CNP     Subjective   Deena Thompson \"Geovani\" is a 24 y.o. female who presents for annual exam.  Patient has a Nexplanon subdermal implant in place and is happy with this method.  Patient has no gynecological complaints or concerns.    Chart review shows last Pap smear L .    Past Medical History:   Diagnosis Date    27 weeks gestation of pregnancy ([facility]) 2018    27 weeks gestation of pregnancy    Anemia complicating pregnancy, second trimester ([facility]) 2018    Anemia during pregnancy in second trimester    Encounter for supervision of normal first pregnancy, second trimester 2018    Encounter for supervision of normal first pregnancy in second trimester    Encounter for supervision of normal first pregnancy, unspecified trimester ([facility]) 10/30/2018    Encounter for supervision of normal first pregnancy    Supervision of pregnancy with insufficient  care, second trimester ([facility]) 2018    Late prenatal care in second trimester    Unspecified pre-existing hypertension complicating pregnancy, unspecified trimester ([facility]) 10/31/2018    Chronic hypertension in pregnancy     No past surgical history on file.    OB History          3    Para   2    Term   2            AB   1    Living   2         SAB        IAB   1    Ectopic        Multiple        Live Births   2               Patient's last menstrual period was 10/18/2024 (approximate).      Review of Systems   All other systems reviewed and are negative.    Breast: No Complaints   Vaginal: No Complaints        Objective   /68 (BP Location: Left arm, Patient Position: Sitting, BP Cuff Size: Adult)   Pulse 89   Resp 16   Ht 1.626 m (5' 4\")   Wt 66.2 kg (146 lb)   LMP 10/18/2024 (Approximate)   BMI 25.06 kg/m²   Physical Exam  Constitutional:       Appearance: Normal appearance. She is normal weight.   Genitourinary:      Vulva, rectum and urethral meatus normal.      " Genitourinary Comments: Blood-tinged discharge      Right Labia: No rash.     Left Labia: No rash.     Vaginal discharge present.      No vaginal prolapse present.     No vaginal atrophy present.       Right Adnexa: no mass present.     Left Adnexa: no mass present.     No cervical motion tenderness.      Uterus is not enlarged or tender.   Cardiovascular:      Rate and Rhythm: Normal rate.      Heart sounds: Normal heart sounds.   Pulmonary:      Effort: Pulmonary effort is normal.      Breath sounds: Normal breath sounds.   Abdominal:      Palpations: Abdomen is soft.   Musculoskeletal:         General: Normal range of motion.      Cervical back: Normal range of motion.   Neurological:      General: No focal deficit present.      Mental Status: She is alert.   Skin:     General: Skin is warm and dry.   Vitals and nursing note reviewed.                   Assessment/Plan   Problem List Items Addressed This Visit    None  Visit Diagnoses         Codes    History of abnormal cervical Pap smear    -  Primary Z87.42    Relevant Orders    THINPREP PAP TEST DIAGNOSTIC (>25)    Well woman exam with routine gynecological exam     Z01.419

## 2024-11-20 LAB
C TRACH RRNA SPEC QL NAA+PROBE: NEGATIVE
N GONORRHOEA DNA SPEC QL PROBE+SIG AMP: NEGATIVE
T VAGINALIS RRNA SPEC QL NAA+PROBE: NEGATIVE

## 2024-11-25 ENCOUNTER — APPOINTMENT (OUTPATIENT)
Dept: BEHAVIORAL HEALTH | Facility: CLINIC | Age: 24
End: 2024-11-25
Payer: COMMERCIAL

## 2024-12-06 LAB
CYTOLOGY CMNT CVX/VAG CYTO-IMP: NORMAL
HPV HR 12 DNA GENITAL QL NAA+PROBE: POSITIVE
HPV HR GENOTYPES PNL CVX NAA+PROBE: POSITIVE
HPV16 DNA SPEC QL NAA+PROBE: NEGATIVE
HPV18 DNA SPEC QL NAA+PROBE: NEGATIVE
LAB AP CONTRACEPTIVE HISTORY: NORMAL
LAB AP HPV GENOTYPE QUESTION: YES
LAB AP HPV HR: NORMAL
LAB AP PAP ADDITIONAL TESTS: NORMAL
LAB AP PREVIOUS ABNORMAL HISTORY: NORMAL
LABORATORY COMMENT REPORT: NORMAL
LMP START DATE: NORMAL
PATH REPORT.TOTAL CANCER: NORMAL

## 2024-12-13 ENCOUNTER — TELEMEDICINE (OUTPATIENT)
Dept: OBSTETRICS AND GYNECOLOGY | Facility: HOSPITAL | Age: 24
End: 2024-12-13
Payer: COMMERCIAL

## 2024-12-13 VITALS — BODY MASS INDEX: 25.75 KG/M2 | WEIGHT: 150 LBS

## 2024-12-13 DIAGNOSIS — R87.610 ATYPICAL SQUAMOUS CELLS OF UNDETERMINED SIGNIFICANCE ON CYTOLOGIC SMEAR OF CERVIX (ASC-US): Primary | ICD-10-CM

## 2024-12-13 PROCEDURE — 99212 OFFICE O/P EST SF 10 MIN: CPT | Performed by: NURSE PRACTITIONER

## 2024-12-13 PROCEDURE — 1036F TOBACCO NON-USER: CPT | Performed by: NURSE PRACTITIONER

## 2024-12-13 ASSESSMENT — ENCOUNTER SYMPTOMS
OCCASIONAL FEELINGS OF UNSTEADINESS: 0
LOSS OF SENSATION IN FEET: 0
DEPRESSION: 0

## 2024-12-13 ASSESSMENT — PAIN SCALES - GENERAL: PAINLEVEL_OUTOF10: 0-NO PAIN

## 2024-12-13 NOTE — PROGRESS NOTES
Unable to connect on virtual visit due to providers schedule.    Spoke on phone and reviewed abnormal pap with patient and need to come in for colpo.      Pt in agreement and will get her scheduled.    ASCUS + high rist HPV, non 16/18   Pap in 2023 LSIL no colpo done.    10 minutes spent on phone

## 2025-01-02 ENCOUNTER — APPOINTMENT (OUTPATIENT)
Facility: CLINIC | Age: 25
End: 2025-01-02
Payer: COMMERCIAL

## 2025-01-15 ENCOUNTER — APPOINTMENT (OUTPATIENT)
Dept: PRIMARY CARE | Facility: CLINIC | Age: 25
End: 2025-01-15
Payer: COMMERCIAL

## 2025-01-15 ENCOUNTER — APPOINTMENT (OUTPATIENT)
Dept: GASTROENTEROLOGY | Facility: HOSPITAL | Age: 25
End: 2025-01-15
Payer: COMMERCIAL

## 2025-01-16 ENCOUNTER — APPOINTMENT (OUTPATIENT)
Dept: NUTRITION | Facility: HOSPITAL | Age: 25
End: 2025-01-16
Payer: COMMERCIAL

## 2025-01-17 ENCOUNTER — APPOINTMENT (OUTPATIENT)
Dept: DERMATOLOGY | Facility: CLINIC | Age: 25
End: 2025-01-17
Payer: COMMERCIAL

## 2025-01-22 ENCOUNTER — APPOINTMENT (OUTPATIENT)
Dept: GASTROENTEROLOGY | Facility: HOSPITAL | Age: 25
End: 2025-01-22
Payer: COMMERCIAL

## 2025-02-19 ENCOUNTER — APPOINTMENT (OUTPATIENT)
Dept: PRIMARY CARE | Facility: CLINIC | Age: 25
End: 2025-02-19
Payer: COMMERCIAL

## 2025-02-19 VITALS
DIASTOLIC BLOOD PRESSURE: 77 MMHG | BODY MASS INDEX: 24.55 KG/M2 | SYSTOLIC BLOOD PRESSURE: 115 MMHG | WEIGHT: 143 LBS | HEART RATE: 88 BPM | OXYGEN SATURATION: 99 %

## 2025-02-19 DIAGNOSIS — R87.610 ASCUS WITH POSITIVE HIGH RISK HPV CERVICAL: ICD-10-CM

## 2025-02-19 DIAGNOSIS — Z80.0 FAMILY HISTORY OF MALIGNANT NEOPLASM OF COLON IN RELATIVE DIAGNOSED WHEN YOUNGER THAN 50 YEARS OF AGE: ICD-10-CM

## 2025-02-19 DIAGNOSIS — R87.810 ASCUS WITH POSITIVE HIGH RISK HPV CERVICAL: ICD-10-CM

## 2025-02-19 DIAGNOSIS — F33.0 MILD EPISODE OF RECURRENT MAJOR DEPRESSIVE DISORDER (CMS-HCC): Primary | ICD-10-CM

## 2025-02-19 PROBLEM — B00.9 HSV-2 INFECTION: Status: RESOLVED | Noted: 2023-04-11 | Resolved: 2025-02-19

## 2025-02-19 PROBLEM — R45.89 DEPRESSED MOOD: Status: RESOLVED | Noted: 2023-04-03 | Resolved: 2025-02-19

## 2025-02-19 PROCEDURE — 1036F TOBACCO NON-USER: CPT | Performed by: FAMILY MEDICINE

## 2025-02-19 PROCEDURE — 99214 OFFICE O/P EST MOD 30 MIN: CPT | Performed by: FAMILY MEDICINE

## 2025-02-19 RX ORDER — SERTRALINE HYDROCHLORIDE 50 MG/1
150 TABLET, FILM COATED ORAL DAILY
Qty: 270 TABLET | Refills: 1 | Status: SHIPPED | OUTPATIENT
Start: 2025-02-19

## 2025-02-19 RX ORDER — ETONOGESTREL 68 MG/1
1 IMPLANT SUBCUTANEOUS ONCE
COMMUNITY
Start: 2024-01-17

## 2025-02-19 ASSESSMENT — COLUMBIA-SUICIDE SEVERITY RATING SCALE - C-SSRS
2. HAVE YOU ACTUALLY HAD ANY THOUGHTS OF KILLING YOURSELF?: NO
1. IN THE PAST MONTH, HAVE YOU WISHED YOU WERE DEAD OR WISHED YOU COULD GO TO SLEEP AND NOT WAKE UP?: NO

## 2025-02-19 ASSESSMENT — PATIENT HEALTH QUESTIONNAIRE - PHQ9
7. TROUBLE CONCENTRATING ON THINGS, SUCH AS READING THE NEWSPAPER OR WATCHING TELEVISION: SEVERAL DAYS
9. THOUGHTS THAT YOU WOULD BE BETTER OFF DEAD, OR OF HURTING YOURSELF: NOT AT ALL
2. FEELING DOWN, DEPRESSED OR HOPELESS: SEVERAL DAYS
4. FEELING TIRED OR HAVING LITTLE ENERGY: MORE THAN HALF THE DAYS
8. MOVING OR SPEAKING SO SLOWLY THAT OTHER PEOPLE COULD HAVE NOTICED. OR THE OPPOSITE, BEING SO FIGETY OR RESTLESS THAT YOU HAVE BEEN MOVING AROUND A LOT MORE THAN USUAL: NOT AT ALL
SUM OF ALL RESPONSES TO PHQ9 QUESTIONS 1 AND 2: 3
5. POOR APPETITE OR OVEREATING: SEVERAL DAYS
SUM OF ALL RESPONSES TO PHQ QUESTIONS 1-9: 8
1. LITTLE INTEREST OR PLEASURE IN DOING THINGS: MORE THAN HALF THE DAYS
10. IF YOU CHECKED OFF ANY PROBLEMS, HOW DIFFICULT HAVE THESE PROBLEMS MADE IT FOR YOU TO DO YOUR WORK, TAKE CARE OF THINGS AT HOME, OR GET ALONG WITH OTHER PEOPLE: SOMEWHAT DIFFICULT
6. FEELING BAD ABOUT YOURSELF - OR THAT YOU ARE A FAILURE OR HAVE LET YOURSELF OR YOUR FAMILY DOWN: SEVERAL DAYS
3. TROUBLE FALLING OR STAYING ASLEEP OR SLEEPING TOO MUCH: NOT AT ALL

## 2025-02-19 NOTE — PROGRESS NOTES
"Subjective   Patient ID: Deena Thompson \"Luna" is a 24 y.o. female who presents for Establish Care.    HPI     The patient is here to establish care. She states she is taking vitamin D3 and Zoloft for anxiety and depression, ibuprofen as needed and uses ketoconazole shampoo.  She had a nexplanon implant in January 2024.  She is taking these medications as prescribed, tolerating them well without any side effects. Mother with diabetes, father had liver cirrhosis, one sister with colon cancer at 37 and the other with diabetes.  Reports her last PAP in 2023 showed cytology and HPV were positive and she follows with GYN.    Blood pressure is stable at this time.  Reports she has felt depressed for some days and felt fatigued most days over the past few weeks.     She got her flu shot in the fall. She will get her colonoscopy in July 2025.        Review of Systems  Constitutional: No fever or chills  Cardiovascular: no chest pain, no palpitations and no syncope.   Respiratory: no cough, no shortness of breath during exertion and no shortness of breath at rest.   Gastrointestinal: no abdominal pain, no nausea and no vomiting.  Neuro: No Headache, no dizziness    Objective   /77 (BP Location: Left arm, Patient Position: Sitting, BP Cuff Size: Adult)   Pulse 88   Wt 64.9 kg (143 lb)   SpO2 99%   Breastfeeding No   BMI 24.55 kg/m²     Physical Exam  Constitutional: Alert and in no acute distress. Well developed, well nourished  Head and Face: Head and face: Normal.    Cardiovascular: Heart rate and rhythm were normal, normal S1 and S2. No peripheral edema.   Pulmonary: No respiratory distress. Clear bilateral breath sounds.  Musculoskeletal: Gait and station: Normal. Muscle strength/tone: Normal.   Skin: Normal skin color and pigmentation, normal skin turgor, and no rash.    Psychiatric: Judgment and insight: Intact. Mood and affect: Normal.      Lab Results   Component Value Date    WBC 6.0 10/20/2023    HGB " 12.5 10/20/2023    HCT 39.3 10/20/2023     10/20/2023    CHOL 168 07/17/2024    TRIG 59 07/17/2024    HDL 43.6 07/17/2024    ALT 28 07/17/2024    AST 21 07/17/2024     07/17/2024    K 4.1 07/17/2024     07/17/2024    CREATININE 0.82 07/17/2024    BUN 11 07/17/2024    CO2 26 07/17/2024    TSH 1.30 07/17/2024    HGBA1C 5.2 07/17/2024       ECG 12 lead (Clinic Performed)  NSR-HR:76 bpm.  Normal axis and normal EKG.      Assessment/Plan   Assessment & Plan  Family history of malignant neoplasm of colon in relative diagnosed when younger than 50 years of age  Colonoscopy scheduled in July 2025.        Mild episode of recurrent major depressive disorder (CMS-HCC)  Increase sertraline to 150 mg    Orders:    Referral to [facility]    sertraline (Zoloft) 50 mg tablet; Take 3 tablets (150 mg) by mouth once daily.    Follow Up In [facility] - PCP - Health Maintenance; Future    ASCUS with positive high risk HPV cervical  PAP in 2023 showed cytology and HPV were positive.  Follows with Gyn.                Your yearly Physical is due in: August 2025  When you call the office for your yearly Physical, please ask them to inform me to order your blood work, so that you can get the fasting blood work before your appointment and we can discuss the results at your physical.      Please call me if any questions arise from now until your next visit. I will call you after I am done seeing patients. A Doctor is always available by phone when the office is closed. Please feel free to call for help with any problem that you feel shouldn't wait until the office re-opens.   Scribe Attestation  By signing my name below, I, Mynor Zaldivar   attest that this documentation has been prepared under the direction and in the presence of Lindsey Adames MD.

## 2025-02-25 ENCOUNTER — APPOINTMENT (OUTPATIENT)
Dept: DERMATOLOGY | Facility: CLINIC | Age: 25
End: 2025-02-25
Payer: COMMERCIAL

## 2025-02-26 ENCOUNTER — APPOINTMENT (OUTPATIENT)
Dept: DERMATOLOGY | Facility: CLINIC | Age: 25
End: 2025-02-26
Payer: COMMERCIAL

## 2025-03-25 ENCOUNTER — APPOINTMENT (OUTPATIENT)
Dept: DERMATOLOGY | Facility: CLINIC | Age: 25
End: 2025-03-25
Payer: COMMERCIAL

## 2025-03-28 ENCOUNTER — PATIENT MESSAGE (OUTPATIENT)
Dept: PRIMARY CARE | Facility: CLINIC | Age: 25
End: 2025-03-28
Payer: COMMERCIAL

## 2025-03-28 DIAGNOSIS — Z12.11 ENCOUNTER FOR SCREENING FOR MALIGNANT NEOPLASM OF COLON: Primary | ICD-10-CM

## 2025-04-02 NOTE — PROGRESS NOTES
"Subjective   Patient ID: Deena Thompson \"Luna" is a 24 y.o. female who presents for an EPV visit.    HPI     The patient states she is taking vitamin D3 and Zoloft for anxiety and depression, ibuprofen as needed and uses ketoconazole shampoo.  She had a nexplanon implant in January 2024.  She is taking these medications as prescribed, tolerating them well without any side effects.     She presents today for [place] paperwork and requests for a reduced work schedule.  She is feeling more anxious and is seeing a therapist at this time for her mental health issues.  She wishes to have Wednesdays off to come back rejuvenated.  Denies any suicidal ideation.    Review of Systems:  Constitutional: No fever or chills  Cardiovascular: no chest pain, no palpitations and no syncope.   Respiratory: no cough, no shortness of breath during exertion and no shortness of breath at rest.   Gastrointestinal: no abdominal pain, no nausea and no vomiting.  Neuro: No Headache, no dizziness    Physical Exam:   Constitutional: Alert and in no acute distress. Well developed, well nourished.   Head and Face: Head and face: Normal.     Eyes: Normal external exam.    Ears, Nose, Mouth, and Throat: External inspection of ears and nose: Normal.  Hearing: Normal.   Neck: No neck mass was observed. Supple.  Pulmonary: No respiratory distress.    Musculoskeletal: Range of motion: Normal.     Skin: Normal skin color and pigmentation, normal skin turgor, and no rash.    Neurologic: Coordination: Normal.    Psychiatric: Judgment and insight: Intact. Mood and affect: Normal.    Lab Results   Component Value Date    WBC 6.0 10/20/2023    HGB 12.5 10/20/2023    HCT 39.3 10/20/2023     10/20/2023    CHOL 168 07/17/2024    TRIG 59 07/17/2024    HDL 43.6 07/17/2024    ALT 28 07/17/2024    AST 21 07/17/2024     07/17/2024    K 4.1 07/17/2024     07/17/2024    CREATININE 0.82 07/17/2024    BUN 11 07/17/2024    CO2 26 07/17/2024    TSH 1.30 " 07/17/2024    HGBA1C 5.2 07/17/2024       ECG 12 lead (Clinic Performed)  NSR-HR:76 bpm.  Normal axis and normal EKG.      Assessment/Plan   Assessment & Plan  Mild episode of recurrent major depressive disorder (CMS-HCC)  Patient is following with the counselor.  Continue Zoloft.  Will fill in paperwork to continue working 4 days a week.          Follow up in August 2025.     This Medical recommendation has been made based on the Telephonic/Video conversation and the history is given by the patient, which I as a Physician and the patient also understand that there are limitations given the lack of an in-person Physical Exam. Patient will call back if symptoms don't improve.    Your yearly Physical is due in:   When you call the office for your yearly Physical, please ask them to inform me to order your blood work, so that you can get the fasting blood work before your appointment and we can discuss the results at your physical.      A Doctor is always available by phone when the office is closed. Please feel free to call for help with any problem that you feel shouldn't wait until the office re-opens.       Scribe Attestation  By signing my name below, IKelsie, Scrpastor   attest that this documentation has been prepared under the direction and in the presence of Lindsey Adames MD.

## 2025-04-03 ENCOUNTER — TELEMEDICINE (OUTPATIENT)
Dept: PRIMARY CARE | Facility: CLINIC | Age: 25
End: 2025-04-03
Payer: COMMERCIAL

## 2025-04-03 DIAGNOSIS — F33.0 MILD EPISODE OF RECURRENT MAJOR DEPRESSIVE DISORDER (CMS-HCC): Primary | ICD-10-CM

## 2025-04-03 PROCEDURE — 99213 OFFICE O/P EST LOW 20 MIN: CPT | Performed by: FAMILY MEDICINE

## 2025-04-03 NOTE — ASSESSMENT & PLAN NOTE
Patient is following with the counselor.  Continue Zoloft.  Will fill in paperwork to continue working 4 days a week.

## 2025-04-07 ENCOUNTER — APPOINTMENT (OUTPATIENT)
Dept: PRIMARY CARE | Facility: CLINIC | Age: 25
End: 2025-04-07
Payer: COMMERCIAL

## 2025-04-30 ENCOUNTER — APPOINTMENT (OUTPATIENT)
Dept: PRIMARY CARE | Facility: CLINIC | Age: 25
End: 2025-04-30
Payer: COMMERCIAL

## 2025-05-01 ENCOUNTER — APPOINTMENT (OUTPATIENT)
Dept: DERMATOLOGY | Facility: CLINIC | Age: 25
End: 2025-05-01
Payer: COMMERCIAL

## 2025-06-10 ENCOUNTER — APPOINTMENT (OUTPATIENT)
Dept: GASTROENTEROLOGY | Facility: HOSPITAL | Age: 25
End: 2025-06-10
Payer: COMMERCIAL

## 2025-06-25 ENCOUNTER — APPOINTMENT (OUTPATIENT)
Dept: PRIMARY CARE | Facility: CLINIC | Age: 25
End: 2025-06-25
Payer: COMMERCIAL

## 2025-07-09 ENCOUNTER — APPOINTMENT (OUTPATIENT)
Dept: PRIMARY CARE | Facility: CLINIC | Age: 25
End: 2025-07-09
Payer: COMMERCIAL

## 2025-07-15 ENCOUNTER — APPOINTMENT (OUTPATIENT)
Dept: GASTROENTEROLOGY | Facility: HOSPITAL | Age: 25
End: 2025-07-15
Payer: COMMERCIAL

## 2025-07-18 ENCOUNTER — APPOINTMENT (OUTPATIENT)
Dept: PRIMARY CARE | Facility: CLINIC | Age: 25
End: 2025-07-18
Payer: COMMERCIAL

## 2025-07-25 ENCOUNTER — OFFICE VISIT (OUTPATIENT)
Dept: PRIMARY CARE | Facility: CLINIC | Age: 25
End: 2025-07-25
Payer: COMMERCIAL

## 2025-07-25 DIAGNOSIS — F41.1 GENERALIZED ANXIETY DISORDER: ICD-10-CM

## 2025-07-25 DIAGNOSIS — F33.0 MILD EPISODE OF RECURRENT MAJOR DEPRESSIVE DISORDER: Primary | ICD-10-CM

## 2025-07-25 PROCEDURE — 99213 OFFICE O/P EST LOW 20 MIN: CPT | Performed by: FAMILY MEDICINE

## 2025-07-25 RX ORDER — VILAZODONE HYDROCHLORIDE 20 MG/1
20 TABLET ORAL
Qty: 30 TABLET | Refills: 0 | Status: SHIPPED | OUTPATIENT
Start: 2025-07-25

## 2025-07-25 RX ORDER — VILAZODONE HYDROCHLORIDE 10 MG/1
10 TABLET ORAL
Qty: 7 TABLET | Refills: 0 | Status: SHIPPED | OUTPATIENT
Start: 2025-07-25

## 2025-07-25 RX ORDER — SERTRALINE HYDROCHLORIDE 50 MG/1
150 TABLET, FILM COATED ORAL DAILY
Qty: 270 TABLET | Refills: 0 | Status: SHIPPED | OUTPATIENT
Start: 2025-07-25

## 2025-07-25 ASSESSMENT — PATIENT HEALTH QUESTIONNAIRE - PHQ9
4. FEELING TIRED OR HAVING LITTLE ENERGY: MORE THAN HALF THE DAYS
2. FEELING DOWN, DEPRESSED OR HOPELESS: MORE THAN HALF THE DAYS
6. FEELING BAD ABOUT YOURSELF - OR THAT YOU ARE A FAILURE OR HAVE LET YOURSELF OR YOUR FAMILY DOWN: MORE THAN HALF THE DAYS
3. TROUBLE FALLING OR STAYING ASLEEP OR SLEEPING TOO MUCH: MORE THAN HALF THE DAYS
9. THOUGHTS THAT YOU WOULD BE BETTER OFF DEAD, OR OF HURTING YOURSELF: NOT AT ALL
1. LITTLE INTEREST OR PLEASURE IN DOING THINGS: MORE THAN HALF THE DAYS
SUM OF ALL RESPONSES TO PHQ QUESTIONS 1-9: 16
7. TROUBLE CONCENTRATING ON THINGS, SUCH AS READING THE NEWSPAPER OR WATCHING TELEVISION: MORE THAN HALF THE DAYS
SUM OF ALL RESPONSES TO PHQ9 QUESTIONS 1 AND 2: 4
5. POOR APPETITE OR OVEREATING: MORE THAN HALF THE DAYS
8. MOVING OR SPEAKING SO SLOWLY THAT OTHER PEOPLE COULD HAVE NOTICED. OR THE OPPOSITE, BEING SO FIGETY OR RESTLESS THAT YOU HAVE BEEN MOVING AROUND A LOT MORE THAN USUAL: MORE THAN HALF THE DAYS

## 2025-07-25 ASSESSMENT — ANXIETY QUESTIONNAIRES
3. WORRYING TOO MUCH ABOUT DIFFERENT THINGS: MORE THAN HALF THE DAYS
1. FEELING NERVOUS, ANXIOUS, OR ON EDGE: MORE THAN HALF THE DAYS
7. FEELING AFRAID AS IF SOMETHING AWFUL MIGHT HAPPEN: SEVERAL DAYS
5. BEING SO RESTLESS THAT IT IS HARD TO SIT STILL: MORE THAN HALF THE DAYS
2. NOT BEING ABLE TO STOP OR CONTROL WORRYING: MORE THAN HALF THE DAYS
6. BECOMING EASILY ANNOYED OR IRRITABLE: MORE THAN HALF THE DAYS
IF YOU CHECKED OFF ANY PROBLEMS ON THIS QUESTIONNAIRE, HOW DIFFICULT HAVE THESE PROBLEMS MADE IT FOR YOU TO DO YOUR WORK, TAKE CARE OF THINGS AT HOME, OR GET ALONG WITH OTHER PEOPLE: SOMEWHAT DIFFICULT
4. TROUBLE RELAXING: MORE THAN HALF THE DAYS
GAD7 TOTAL SCORE: 13

## 2025-07-25 NOTE — ASSESSMENT & PLAN NOTE
Start taking Viibryd as prescribed.   Continue Zoloft 50 mg as prescribed.     Orders:    vilazodone (Viibryd) 10 mg tablet; Take 1 tablet (10 mg) by mouth once daily with breakfast.    vilazodone (Viibryd) 20 mg tablet; Take 1 tablet (20 mg) by mouth once daily with breakfast. Start after completing the 10 mg pills for 7 days    sertraline (Zoloft) 50 mg tablet; Take 3 tablets (150 mg) by mouth once daily.

## 2025-07-25 NOTE — PROGRESS NOTES
"Subjective   Patient ID: Deena Thompson \"Luna" is a 24 y.o. female who presents for an EPV visit.    HPI     The patient states she is taking vitamin D3 and Zoloft for anxiety and depression and has a nexplanon implant since January 2024.  She is taking these medications as prescribed, tolerating them well without any side effects.     Today she presents with concerns over her anxiety and irritability which have increased of late as her father has been in and out of hospital due to multiple sclerosis.  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  She thinks she is in between a depressive state and anxiety and thinks her work is affected by her emotional reactions.   She states she had little interest or pleasure in doing things over the past 2 weeks all days, feeling down, trouble falling asleep, feeling tired, poor appetite, feeling bad about oneself, trouble concentrating and restless, feeling anxious and worrying too much about different things most days.     Review of Systems:  Constitutional: No fever or chills  Cardiovascular: no chest pain, no palpitations and no syncope.   Respiratory: no cough, no shortness of breath during exertion and no shortness of breath at rest.   Gastrointestinal: no abdominal pain, no nausea and no vomiting.  Neuro: No Headache, no dizziness +anxiety and depression    Physical Exam:   Constitutional: Alert and in no acute distress. Well developed, well nourished.   Head and Face: Head and face: Normal.     Eyes: Normal external exam.    Ears, Nose, Mouth, and Throat: External inspection of ears and nose: Normal.  Hearing: Normal.   Neck: No neck mass was observed. Supple.  Pulmonary: No respiratory distress.    Musculoskeletal: Range of motion: Normal.     Skin: Normal skin color and pigmentation, normal skin turgor, and no rash.    Neurologic: Coordination: Normal.    Psychiatric: Judgment and insight: Intact. Mood and affect: " Normal.    Lab Results   Component Value Date    WBC 6.0 10/20/2023    HGB 12.5 10/20/2023    HCT 39.3 10/20/2023     10/20/2023    CHOL 168 07/17/2024    TRIG 59 07/17/2024    HDL 43.6 07/17/2024    ALT 28 07/17/2024    AST 21 07/17/2024     07/17/2024    K 4.1 07/17/2024     07/17/2024    CREATININE 0.82 07/17/2024    BUN 11 07/17/2024    CO2 26 07/17/2024    TSH 1.30 07/17/2024    HGBA1C 5.2 07/17/2024       ECG 12 lead (Clinic Performed)  NSR-HR:76 bpm.  Normal axis and normal EKG.      Assessment/Plan   Assessment & Plan  Mild episode of recurrent major depressive disorder  Start taking Viibryd as prescribed.   Continue Zoloft 50 mg as prescribed.     Orders:    vilazodone (Viibryd) 10 mg tablet; Take 1 tablet (10 mg) by mouth once daily with breakfast.    vilazodone (Viibryd) 20 mg tablet; Take 1 tablet (20 mg) by mouth once daily with breakfast. Start after completing the 10 mg pills for 7 days    sertraline (Zoloft) 50 mg tablet; Take 3 tablets (150 mg) by mouth once daily.    Generalized anxiety disorder  Start taking Viibryd as prescribed.   Continue Zoloft 50 mg as prescribed.   Orders:    vilazodone (Viibryd) 10 mg tablet; Take 1 tablet (10 mg) by mouth once daily with breakfast.    vilazodone (Viibryd) 20 mg tablet; Take 1 tablet (20 mg) by mouth once daily with breakfast. Start after completing the 10 mg pills for 7 days          Your yearly Physical is due in: August 2025  When you call the office for your yearly Physical, please ask them to inform me to order your blood work, so that you can get the fasting blood work before your appointment and we can discuss the results at your physical.      A Doctor is always available by phone when the office is closed. Please feel free to call for help with any problem that you feel shouldn't wait until the office re-opens.     Patient Location: home    I have communicated my name and active licensure. Video visit completed with realtime  synchronous video/audio connection. Informed consent was obtained from the patient. Patient was made aware that my evaluation and diagnosis are limited due to the fact that we are not in the same room during the interview and that this is a virtual encounter that took place via videoconferencing. Patient verbalized understanding.     I, Lindsey Adames MD, attest that this note for 7/25/2025 accurately reflects documentation that my scribe Kelsie Willis, made at my direction in my capacity as Lindsey Adames MD when I treated Geovani Thompson.    Scribe Attestation  By signing my name below, IKelsie, Scribe   attest that this documentation has been prepared under the direction and in the presence of Lindsey Adames MD.

## 2025-07-25 NOTE — ASSESSMENT & PLAN NOTE
Start taking Viibryd as prescribed.   Continue Zoloft 50 mg as prescribed.   Orders:    vilazodone (Viibryd) 10 mg tablet; Take 1 tablet (10 mg) by mouth once daily with breakfast.    vilazodone (Viibryd) 20 mg tablet; Take 1 tablet (20 mg) by mouth once daily with breakfast. Start after completing the 10 mg pills for 7 days

## 2025-07-30 ENCOUNTER — APPOINTMENT (OUTPATIENT)
Dept: PRIMARY CARE | Facility: CLINIC | Age: 25
End: 2025-07-30
Payer: COMMERCIAL

## 2025-08-06 ENCOUNTER — ANESTHESIA EVENT (OUTPATIENT)
Dept: GASTROENTEROLOGY | Facility: HOSPITAL | Age: 25
End: 2025-08-06
Payer: COMMERCIAL

## 2025-08-06 ENCOUNTER — HOSPITAL ENCOUNTER (OUTPATIENT)
Dept: GASTROENTEROLOGY | Facility: HOSPITAL | Age: 25
Discharge: HOME | End: 2025-08-06
Payer: COMMERCIAL

## 2025-08-06 ENCOUNTER — ANESTHESIA (OUTPATIENT)
Dept: GASTROENTEROLOGY | Facility: HOSPITAL | Age: 25
End: 2025-08-06
Payer: COMMERCIAL

## 2025-08-06 VITALS
HEIGHT: 64 IN | TEMPERATURE: 96.4 F | DIASTOLIC BLOOD PRESSURE: 75 MMHG | RESPIRATION RATE: 16 BRPM | SYSTOLIC BLOOD PRESSURE: 107 MMHG | OXYGEN SATURATION: 100 % | BODY MASS INDEX: 23.9 KG/M2 | HEART RATE: 72 BPM | WEIGHT: 140 LBS

## 2025-08-06 DIAGNOSIS — Z12.11 ENCOUNTER FOR SCREENING FOR MALIGNANT NEOPLASM OF COLON: ICD-10-CM

## 2025-08-06 LAB — PREGNANCY TEST URINE, POC: NORMAL

## 2025-08-06 PROCEDURE — A45378 PR COLONOSCOPY,DIAGNOSTIC: Performed by: ANESTHESIOLOGY

## 2025-08-06 PROCEDURE — 7100000010 HC PHASE TWO TIME - EACH INCREMENTAL 1 MINUTE

## 2025-08-06 PROCEDURE — 3700000001 HC GENERAL ANESTHESIA TIME - INITIAL BASE CHARGE

## 2025-08-06 PROCEDURE — G0105 COLORECTAL SCRN; HI RISK IND: HCPCS | Performed by: STUDENT IN AN ORGANIZED HEALTH CARE EDUCATION/TRAINING PROGRAM

## 2025-08-06 PROCEDURE — 7100000009 HC PHASE TWO TIME - INITIAL BASE CHARGE

## 2025-08-06 PROCEDURE — 45378 DIAGNOSTIC COLONOSCOPY: CPT | Performed by: STUDENT IN AN ORGANIZED HEALTH CARE EDUCATION/TRAINING PROGRAM

## 2025-08-06 PROCEDURE — 2500000004 HC RX 250 GENERAL PHARMACY W/ HCPCS (ALT 636 FOR OP/ED)

## 2025-08-06 PROCEDURE — 3700000002 HC GENERAL ANESTHESIA TIME - EACH INCREMENTAL 1 MINUTE

## 2025-08-06 PROCEDURE — A45378 PR COLONOSCOPY,DIAGNOSTIC

## 2025-08-06 RX ORDER — PROPOFOL 10 MG/ML
INJECTION, EMULSION INTRAVENOUS CONTINUOUS PRN
Status: DISCONTINUED | OUTPATIENT
Start: 2025-08-06 | End: 2025-08-06

## 2025-08-06 RX ORDER — MIDAZOLAM HYDROCHLORIDE 2 MG/2ML
INJECTION, SOLUTION INTRAMUSCULAR; INTRAVENOUS AS NEEDED
Status: DISCONTINUED | OUTPATIENT
Start: 2025-08-06 | End: 2025-08-06

## 2025-08-06 RX ORDER — PHENYLEPHRINE HCL IN 0.9% NACL 1 MG/10 ML
SYRINGE (ML) INTRAVENOUS AS NEEDED
Status: DISCONTINUED | OUTPATIENT
Start: 2025-08-06 | End: 2025-08-06

## 2025-08-06 RX ADMIN — SODIUM CHLORIDE, SODIUM LACTATE, POTASSIUM CHLORIDE, AND CALCIUM CHLORIDE: 600; 310; 30; 20 INJECTION, SOLUTION INTRAVENOUS at 09:42

## 2025-08-06 RX ADMIN — MIDAZOLAM HYDROCHLORIDE 2 MG: 2 INJECTION, SOLUTION INTRAMUSCULAR; INTRAVENOUS at 09:38

## 2025-08-06 RX ADMIN — PROPOFOL 30 MG: 10 INJECTION, EMULSION INTRAVENOUS at 09:46

## 2025-08-06 RX ADMIN — PROPOFOL 200 MCG/KG/MIN: 10 INJECTION, EMULSION INTRAVENOUS at 09:42

## 2025-08-06 RX ADMIN — Medication 80 MCG: at 09:50

## 2025-08-06 RX ADMIN — PROPOFOL 60 MG: 10 INJECTION, EMULSION INTRAVENOUS at 09:43

## 2025-08-06 ASSESSMENT — ENCOUNTER SYMPTOMS
DIFFICULTY URINATING: 0
FEVER: 0
SHORTNESS OF BREATH: 0
TROUBLE SWALLOWING: 0
CONSTIPATION: 0
JOINT SWELLING: 0
ARTHRALGIAS: 0
COLOR CHANGE: 0
WHEEZING: 0
SPEECH DIFFICULTY: 0
HEADACHES: 0
SLEEP DISTURBANCE: 0
UNEXPECTED WEIGHT CHANGE: 0
ABDOMINAL PAIN: 0
LIGHT-HEADEDNESS: 0
DIARRHEA: 0
COUGH: 0
CHILLS: 0
CONFUSION: 0
ABDOMINAL DISTENTION: 0
DIZZINESS: 0
VOMITING: 0
NAUSEA: 0
BLOOD IN STOOL: 0

## 2025-08-06 ASSESSMENT — COLUMBIA-SUICIDE SEVERITY RATING SCALE - C-SSRS
1. IN THE PAST MONTH, HAVE YOU WISHED YOU WERE DEAD OR WISHED YOU COULD GO TO SLEEP AND NOT WAKE UP?: NO
2. HAVE YOU ACTUALLY HAD ANY THOUGHTS OF KILLING YOURSELF?: NO
6. HAVE YOU EVER DONE ANYTHING, STARTED TO DO ANYTHING, OR PREPARED TO DO ANYTHING TO END YOUR LIFE?: NO

## 2025-08-06 ASSESSMENT — PAIN SCALES - GENERAL
PAINLEVEL_OUTOF10: 0 - NO PAIN

## 2025-08-06 ASSESSMENT — PAIN - FUNCTIONAL ASSESSMENT
PAIN_FUNCTIONAL_ASSESSMENT: 0-10

## 2025-08-06 NOTE — H&P
"History Of Present Illness  Deena Thmopson \"Luna" is a 24 y.o. female presenting for high risk screening colonoscopy.     Pt's sister had colon cancer at age 36.      Past Medical History  Medical History[1]  Surgical History  Surgical History[2]  Social History  She reports that she has never smoked. She has never been exposed to tobacco smoke. She has never used smokeless tobacco. She reports that she does not drink alcohol and does not use drugs.    Family History  Family History[3]     Allergies  Allergies[4]  Review of Systems   Constitutional:  Negative for chills, fever and unexpected weight change.   HENT:  Negative for congestion and trouble swallowing.    Respiratory:  Negative for cough, shortness of breath and wheezing.    Cardiovascular:  Negative for chest pain.   Gastrointestinal:  Negative for abdominal distention, abdominal pain, blood in stool, constipation, diarrhea, nausea and vomiting.   Genitourinary:  Negative for difficulty urinating.   Musculoskeletal:  Negative for arthralgias and joint swelling.   Skin:  Negative for color change.   Neurological:  Negative for dizziness, speech difficulty, light-headedness and headaches.   Psychiatric/Behavioral:  Negative for confusion and sleep disturbance.         Physical Exam  Constitutional:       General: She is awake.      Appearance: Normal appearance.   HENT:      Head: Normocephalic and atraumatic.      Nose: Nose normal.      Mouth/Throat:      Mouth: Mucous membranes are moist.     Eyes:      Pupils: Pupils are equal, round, and reactive to light.     Neck:      Thyroid: No thyroid mass.      Trachea: Phonation normal.     Cardiovascular:      Rate and Rhythm: Normal rate and regular rhythm.   Pulmonary:      Effort: Pulmonary effort is normal. No respiratory distress.      Breath sounds: Normal air entry. No decreased breath sounds.   Abdominal:      General: Bowel sounds are normal. There is no distension.      Palpations: Abdomen is " "soft.      Tenderness: There is no abdominal tenderness.     Musculoskeletal:      Cervical back: Neck supple.      Right lower leg: No edema.      Left lower leg: No edema.     Skin:     General: Skin is warm.      Capillary Refill: Capillary refill takes less than 2 seconds.     Neurological:      General: No focal deficit present.      Mental Status: She is alert and oriented to person, place, and time. Mental status is at baseline.      Cranial Nerves: Cranial nerves 2-12 are intact.      Motor: Motor function is intact.     Psychiatric:         Attention and Perception: Attention and perception normal.         Mood and Affect: Mood normal.         Speech: Speech normal.         Behavior: Behavior normal.          Last Recorded Vitals  Blood pressure 118/78, pulse 106, temperature 36.2 °C (97.2 °F), temperature source Temporal, resp. rate 16, height 1.626 m (5' 4\"), weight 63.5 kg (140 lb), last menstrual period 2025, SpO2 100%, not currently breastfeeding.    Assessment/Plan   Proceed with colonoscopy      Marcus Mcclure MD       [1]   Past Medical History:  Diagnosis Date    27 weeks gestation of pregnancy ([facility]) 2018    27 weeks gestation of pregnancy    Anemia     Anemia complicating pregnancy, second trimester 2018    Anemia during pregnancy in second trimester    Anxiety     Depression     Eczema     Encounter for supervision of normal first pregnancy, second trimester 2018    Encounter for supervision of normal first pregnancy in second trimester    Encounter for supervision of normal first pregnancy, unspecified trimester ([facility]) 10/30/2018    Encounter for supervision of normal first pregnancy    Supervision of pregnancy with insufficient  care, second trimester ([facility]) 2018    Late prenatal care in second trimester    Unspecified pre-existing hypertension complicating pregnancy, unspecified trimester ([facility]) 10/31/2018    Chronic hypertension in " pregnancy   [2]   Past Surgical History:  Procedure Laterality Date    STOMACH SURGERY  June 2024    UMBILICAL HERNIA REPAIR  2024   [3]   Family History  Problem Relation Name Age of Onset    Diabetes type II Mother      Multiple sclerosis Father      Other (Colon cancer at 37yo) Sister Bernadine 36    Diabetes Sister Crystal     Multiple sclerosis Paternal Grandmother      Eczema Sister Crystal     Diabetes Sister Crystal     Cancer Sister Bernadine    [4] No Known Allergies

## 2025-08-06 NOTE — ANESTHESIA PREPROCEDURE EVALUATION
"Patient: Deena Thompson \"Geovani\"    Procedure Information       Anesthesia Start Date/Time: 08/06/25 0939    Scheduled providers: Rena Ellis MD; Marie Mendes RN    Procedure: COLONOSCOPY    Location: [facility]            Relevant Problems   Neuro   (+) Generalized anxiety disorder   (+) Mild episode of recurrent major depressive disorder       Clinical information reviewed:   Tobacco  Allergies  Meds   Med Hx  Surg Hx  OB Status  Fam Hx  Soc   Hx        NPO Detail:  NPO/Void Status  Date of Last Liquid: 08/06/25  Time of Last Liquid: 0300  Date of Last Solid: 08/04/25  Last Intake Type: GI prep         Physical Exam    Airway  Mallampati: II     Cardiovascular   Rhythm: regular  Rate: normal     Dental    Pulmonary Breath sounds clear to auscultation     Abdominal            Anesthesia Plan    History of general anesthesia?: yes  History of complications of general anesthesia?: no    ASA 2     MAC     Anesthetic plan and risks discussed with patient.      "

## 2025-08-06 NOTE — ANESTHESIA POSTPROCEDURE EVALUATION
"Patient: Deena Thompson \"Geovani\"    Procedure Summary       Date: 08/06/25 Room / Location: [facility]    Anesthesia Start: 0939 Anesthesia Stop: 1033    Procedure: COLONOSCOPY Diagnosis: Family history of colon cancer    Scheduled Providers: Rena Ellis MD; Marie Mendes RN Responsible Provider: Mario Osuna MD    Anesthesia Type: MAC ASA Status: 2            Anesthesia Type: MAC    Vitals Value Taken Time   /75 08/06/25 10:58   Temp 35.8 °C (96.4 °F) 08/06/25 10:28   Pulse 72 08/06/25 10:58   Resp 16 08/06/25 10:58   SpO2 100 % 08/06/25 10:58       Anesthesia Post Evaluation    Patient participation: complete - patient participated  Level of consciousness: awake and alert  Pain management: adequate  Airway patency: patent  Cardiovascular status: acceptable  Respiratory status: acceptable  Hydration status: acceptable  Postoperative Nausea and Vomiting: none        No notable events documented.    "

## 2025-08-13 ENCOUNTER — APPOINTMENT (OUTPATIENT)
Dept: PRIMARY CARE | Facility: CLINIC | Age: 25
End: 2025-08-13
Payer: COMMERCIAL

## 2025-08-15 ENCOUNTER — OFFICE VISIT (OUTPATIENT)
Dept: URGENT CARE | Age: 25
End: 2025-08-15
Payer: COMMERCIAL

## 2025-08-15 VITALS
HEART RATE: 105 BPM | OXYGEN SATURATION: 99 % | RESPIRATION RATE: 16 BRPM | SYSTOLIC BLOOD PRESSURE: 128 MMHG | DIASTOLIC BLOOD PRESSURE: 81 MMHG | TEMPERATURE: 98.8 F

## 2025-08-15 DIAGNOSIS — K08.89 PAIN, DENTAL: Primary | ICD-10-CM

## 2025-08-15 RX ORDER — AMOXICILLIN AND CLAVULANATE POTASSIUM 875; 125 MG/1; MG/1
875 TABLET, FILM COATED ORAL 2 TIMES DAILY
Qty: 20 TABLET | Refills: 0 | Status: SHIPPED | OUTPATIENT
Start: 2025-08-15

## 2025-08-15 RX ORDER — PREDNISONE 20 MG/1
20 TABLET ORAL 2 TIMES DAILY
Qty: 10 TABLET | Refills: 0 | Status: SHIPPED | OUTPATIENT
Start: 2025-08-15 | End: 2025-08-20

## 2025-08-15 ASSESSMENT — ENCOUNTER SYMPTOMS
CONSTITUTIONAL NEGATIVE: 1
ALLERGIC/IMMUNOLOGIC NEGATIVE: 1
MUSCULOSKELETAL NEGATIVE: 1
PSYCHIATRIC NEGATIVE: 1
HEMATOLOGIC/LYMPHATIC NEGATIVE: 1
NEUROLOGICAL NEGATIVE: 1
EYES NEGATIVE: 1
ENDOCRINE NEGATIVE: 1
CARDIOVASCULAR NEGATIVE: 1
RESPIRATORY NEGATIVE: 1

## 2025-08-18 ASSESSMENT — PROMIS GLOBAL HEALTH SCALE
RATE_QUALITY_OF_LIFE: GOOD
RATE_SOCIAL_SATISFACTION: GOOD
RATE_AVERAGE_PAIN: 5
RATE_GENERAL_HEALTH: GOOD
RATE_MENTAL_HEALTH: FAIR
CARRYOUT_SOCIAL_ACTIVITIES: FAIR
CARRYOUT_PHYSICAL_ACTIVITIES: MODERATELY
RATE_PHYSICAL_HEALTH: GOOD
RATE_AVERAGE_FATIGUE: MODERATE
EMOTIONAL_PROBLEMS: OFTEN

## 2025-08-20 ENCOUNTER — APPOINTMENT (OUTPATIENT)
Dept: PRIMARY CARE | Facility: CLINIC | Age: 25
End: 2025-08-20
Payer: COMMERCIAL

## 2025-08-20 VITALS
WEIGHT: 134 LBS | SYSTOLIC BLOOD PRESSURE: 138 MMHG | DIASTOLIC BLOOD PRESSURE: 76 MMHG | BODY MASS INDEX: 22.88 KG/M2 | HEART RATE: 94 BPM | HEIGHT: 64 IN | OXYGEN SATURATION: 97 %

## 2025-08-20 DIAGNOSIS — R87.810 ASCUS WITH POSITIVE HIGH RISK HPV CERVICAL: ICD-10-CM

## 2025-08-20 DIAGNOSIS — E55.9 VITAMIN D DEFICIENCY: ICD-10-CM

## 2025-08-20 DIAGNOSIS — Z23 ENCOUNTER FOR IMMUNIZATION: ICD-10-CM

## 2025-08-20 DIAGNOSIS — Z00.00 ANNUAL PHYSICAL EXAM: Primary | ICD-10-CM

## 2025-08-20 DIAGNOSIS — R87.610 ASCUS WITH POSITIVE HIGH RISK HPV CERVICAL: ICD-10-CM

## 2025-08-20 DIAGNOSIS — F33.0 MILD EPISODE OF RECURRENT MAJOR DEPRESSIVE DISORDER: ICD-10-CM

## 2025-08-20 DIAGNOSIS — F41.1 GENERALIZED ANXIETY DISORDER: ICD-10-CM

## 2025-08-20 PROCEDURE — 90472 IMMUNIZATION ADMIN EACH ADD: CPT | Performed by: FAMILY MEDICINE

## 2025-08-20 PROCEDURE — 1036F TOBACCO NON-USER: CPT | Performed by: FAMILY MEDICINE

## 2025-08-20 PROCEDURE — 99395 PREV VISIT EST AGE 18-39: CPT | Performed by: FAMILY MEDICINE

## 2025-08-20 PROCEDURE — 90471 IMMUNIZATION ADMIN: CPT | Performed by: FAMILY MEDICINE

## 2025-08-20 PROCEDURE — 90651 9VHPV VACCINE 2/3 DOSE IM: CPT | Performed by: FAMILY MEDICINE

## 2025-08-20 PROCEDURE — 90715 TDAP VACCINE 7 YRS/> IM: CPT | Performed by: FAMILY MEDICINE

## 2025-08-20 PROCEDURE — 3008F BODY MASS INDEX DOCD: CPT | Performed by: FAMILY MEDICINE

## 2025-08-20 RX ORDER — DULOXETIN HYDROCHLORIDE 30 MG/1
30 CAPSULE, DELAYED RELEASE ORAL DAILY
Qty: 90 CAPSULE | Refills: 0 | Status: SHIPPED | OUTPATIENT
Start: 2025-08-20

## 2025-08-20 RX ORDER — ACETAMINOPHEN 500 MG
TABLET ORAL DAILY
COMMUNITY

## 2025-08-20 ASSESSMENT — ANXIETY QUESTIONNAIRES
3. WORRYING TOO MUCH ABOUT DIFFERENT THINGS: MORE THAN HALF THE DAYS
IF YOU CHECKED OFF ANY PROBLEMS ON THIS QUESTIONNAIRE, HOW DIFFICULT HAVE THESE PROBLEMS MADE IT FOR YOU TO DO YOUR WORK, TAKE CARE OF THINGS AT HOME, OR GET ALONG WITH OTHER PEOPLE: SOMEWHAT DIFFICULT
1. FEELING NERVOUS, ANXIOUS, OR ON EDGE: MORE THAN HALF THE DAYS
5. BEING SO RESTLESS THAT IT IS HARD TO SIT STILL: MORE THAN HALF THE DAYS
2. NOT BEING ABLE TO STOP OR CONTROL WORRYING: MORE THAN HALF THE DAYS
GAD7 TOTAL SCORE: 13
4. TROUBLE RELAXING: SEVERAL DAYS
6. BECOMING EASILY ANNOYED OR IRRITABLE: NEARLY EVERY DAY
7. FEELING AFRAID AS IF SOMETHING AWFUL MIGHT HAPPEN: SEVERAL DAYS

## 2025-08-20 ASSESSMENT — PATIENT HEALTH QUESTIONNAIRE - PHQ9
5. POOR APPETITE OR OVEREATING: MORE THAN HALF THE DAYS
7. TROUBLE CONCENTRATING ON THINGS, SUCH AS READING THE NEWSPAPER OR WATCHING TELEVISION: MORE THAN HALF THE DAYS
6. FEELING BAD ABOUT YOURSELF - OR THAT YOU ARE A FAILURE OR HAVE LET YOURSELF OR YOUR FAMILY DOWN: MORE THAN HALF THE DAYS
4. FEELING TIRED OR HAVING LITTLE ENERGY: NEARLY EVERY DAY
SUM OF ALL RESPONSES TO PHQ QUESTIONS 1-9: 16
8. MOVING OR SPEAKING SO SLOWLY THAT OTHER PEOPLE COULD HAVE NOTICED. OR THE OPPOSITE, BEING SO FIGETY OR RESTLESS THAT YOU HAVE BEEN MOVING AROUND A LOT MORE THAN USUAL: MORE THAN HALF THE DAYS
SUM OF ALL RESPONSES TO PHQ9 QUESTIONS 1 AND 2: 4
2. FEELING DOWN, DEPRESSED OR HOPELESS: MORE THAN HALF THE DAYS
1. LITTLE INTEREST OR PLEASURE IN DOING THINGS: MORE THAN HALF THE DAYS
9. THOUGHTS THAT YOU WOULD BE BETTER OFF DEAD, OR OF HURTING YOURSELF: NOT AT ALL
3. TROUBLE FALLING OR STAYING ASLEEP OR SLEEPING TOO MUCH: SEVERAL DAYS

## 2025-09-22 ENCOUNTER — APPOINTMENT (OUTPATIENT)
Dept: PRIMARY CARE | Facility: CLINIC | Age: 25
End: 2025-09-22
Payer: COMMERCIAL

## 2025-09-25 ENCOUNTER — APPOINTMENT (OUTPATIENT)
Dept: PRIMARY CARE | Facility: CLINIC | Age: 25
End: 2025-09-25
Payer: COMMERCIAL

## 2025-11-19 ENCOUNTER — APPOINTMENT (OUTPATIENT)
Dept: OBSTETRICS AND GYNECOLOGY | Facility: CLINIC | Age: 25
End: 2025-11-19
Payer: COMMERCIAL

## 2025-11-24 ENCOUNTER — APPOINTMENT (OUTPATIENT)
Dept: OBSTETRICS AND GYNECOLOGY | Facility: CLINIC | Age: 25
End: 2025-11-24
Payer: COMMERCIAL